# Patient Record
Sex: MALE | Race: WHITE | NOT HISPANIC OR LATINO | URBAN - METROPOLITAN AREA
[De-identification: names, ages, dates, MRNs, and addresses within clinical notes are randomized per-mention and may not be internally consistent; named-entity substitution may affect disease eponyms.]

---

## 2019-03-05 ENCOUNTER — HOSPITAL ENCOUNTER (OUTPATIENT)
Dept: OTHER | Facility: HOSPITAL | Age: 29
Discharge: HOME | End: 2019-03-05
Attending: ANESTHESIOLOGY | Admitting: ANESTHESIOLOGY

## 2019-03-05 LAB
ALBUMIN SERPL-MCNC: 4 G/DL (ref 3.5–4.8)
ALBUMIN/GLOB SERPL: 1.4 {RATIO} (ref 1–1.7)
ALP SERPL-CCNC: 43 IU/L (ref 32–91)
ALT SERPL-CCNC: 69 IU/L (ref 17–63)
ANION GAP SERPL CALC-SCNC: 15.5 MMOL/L (ref 10–20)
APTT BLD: 23.5 SEC (ref 24–31)
AST SERPL-CCNC: 27 IU/L (ref 15–41)
BASOPHILS # BLD AUTO: 0 10*3/UL (ref 0–0.2)
BASOPHILS NFR BLD AUTO: 0 % (ref 0–2)
BILIRUB SERPL-MCNC: 0.6 MG/DL (ref 0.3–1.2)
BILIRUB UR QL STRIP: NEGATIVE MG/DL
BUN SERPL-MCNC: 11 MG/DL (ref 8–20)
BUN/CREAT SERPL: 12.2 (ref 6.2–20.3)
CALCIUM SERPL-MCNC: 9 MG/DL (ref 8.9–10.3)
CASTS URNS QL MICRO: NORMAL /[LPF]
CHLORIDE SERPL-SCNC: 98 MMOL/L (ref 101–111)
COLOR UR: YELLOW
CONV BACTERIA IN URINE MICRO: NEGATIVE
CONV CLARITY OF URINE: CLEAR
CONV CO2: 27 MMOL/L (ref 22–32)
CONV HYALINE CASTS IN URINE MICRO: 0 /[LPF] (ref 0–5)
CONV PROTEIN IN URINE BY AUTOMATED TEST STRIP: NEGATIVE MG/DL
CONV SMALL ROUND CELLS: NORMAL /[HPF]
CONV SMEAR REVIEW: (no result)
CONV TOTAL PROTEIN: 6.9 G/DL (ref 6.1–7.9)
CONV UROBILINOGEN IN URINE BY AUTOMATED TEST STRIP: 0.2 MG/DL
CREAT UR-MCNC: 0.9 MG/DL (ref 0.7–1.2)
CULTURE INDICATED?: NORMAL
DIFFERENTIAL METHOD BLD: (no result)
EOSINOPHIL # BLD AUTO: 0 % (ref 0–3)
EOSINOPHIL # BLD AUTO: 0 10*3/UL (ref 0–0.3)
ERYTHROCYTE [DISTWIDTH] IN BLOOD BY AUTOMATED COUNT: 12.7 % (ref 11.5–14.5)
GLOBULIN UR ELPH-MCNC: 2.9 G/DL (ref 2.5–3.8)
GLUCOSE SERPL-MCNC: 142 MG/DL (ref 65–99)
GLUCOSE UR QL: NEGATIVE MG/DL
HCT VFR BLD AUTO: 53.1 % (ref 40–54)
HGB BLD-MCNC: 17.8 G/DL (ref 14–18)
HGB UR QL STRIP: NEGATIVE
INR PPP: 0.9
KETONES UR QL STRIP: NEGATIVE MG/DL
LEUKOCYTE ESTERASE UR QL STRIP: NEGATIVE
LYMPHOCYTES # BLD AUTO: 1.2 10*3/UL (ref 0.8–4.8)
LYMPHOCYTES NFR BLD AUTO: 8 % (ref 18–42)
MCH RBC QN AUTO: 30 PG (ref 26–32)
MCHC RBC AUTO-ENTMCNC: 33.6 G/DL (ref 32–36)
MCV RBC AUTO: 89.3 FL (ref 80–94)
MONOCYTES # BLD AUTO: 0.8 10*3/UL (ref 0.1–1.3)
MONOCYTES NFR BLD AUTO: 5 % (ref 2–11)
NEUTROPHILS # BLD AUTO: 13.5 10*3/UL (ref 2.3–8.6)
NEUTROPHILS NFR BLD AUTO: 87 % (ref 50–75)
NITRITE UR QL STRIP: NEGATIVE
NRBC BLD AUTO-RTO: 0 /100{WBCS}
PH UR STRIP.AUTO: 6.5 [PH] (ref 4.5–8)
PLATELET # BLD AUTO: 278 10*3/UL (ref 150–450)
PMV BLD AUTO: 9.1 FL (ref 7.4–10.4)
POTASSIUM SERPL-SCNC: 4.5 MMOL/L (ref 3.6–5.1)
PROTHROMBIN TIME: 9.7 SEC (ref 9.6–11.7)
RBC # BLD AUTO: 5.95 10*6/UL (ref 4.6–6)
RBC #/AREA URNS HPF: 2 /[HPF] (ref 0–3)
SODIUM SERPL-SCNC: 136 MMOL/L (ref 136–144)
SP GR UR: 1.02 (ref 1–1.03)
SPERM URNS QL MICRO: NORMAL /[HPF]
SQUAMOUS SPT QL MICRO: 0 /[HPF] (ref 0–5)
UNIDENT CRYS URNS QL MICRO: NORMAL /[HPF]
WBC # BLD AUTO: 15.5 10*3/UL (ref 4.5–11.5)
WBC #/AREA URNS HPF: 0 /[HPF] (ref 0–5)
YEAST SPEC QL WET PREP: NORMAL /[HPF]

## 2023-04-20 ENCOUNTER — APPOINTMENT (OUTPATIENT)
Dept: GENERAL RADIOLOGY | Facility: HOSPITAL | Age: 33
End: 2023-04-20
Payer: COMMERCIAL

## 2023-04-20 ENCOUNTER — HOSPITAL ENCOUNTER (EMERGENCY)
Facility: HOSPITAL | Age: 33
Discharge: HOME | End: 2023-04-20
Attending: EMERGENCY MEDICINE
Payer: COMMERCIAL

## 2023-04-20 ENCOUNTER — APPOINTMENT (OUTPATIENT)
Dept: CT IMAGING | Facility: HOSPITAL | Age: 33
End: 2023-04-20
Payer: COMMERCIAL

## 2023-04-20 VITALS
HEIGHT: 71 IN | OXYGEN SATURATION: 96 % | TEMPERATURE: 97.6 F | HEART RATE: 75 BPM | SYSTOLIC BLOOD PRESSURE: 112 MMHG | WEIGHT: 264.99 LBS | BODY MASS INDEX: 37.1 KG/M2 | RESPIRATION RATE: 17 BRPM | DIASTOLIC BLOOD PRESSURE: 81 MMHG

## 2023-04-20 DIAGNOSIS — S80.02XA CONTUSION OF LEFT KNEE, INITIAL ENCOUNTER: ICD-10-CM

## 2023-04-20 DIAGNOSIS — M54.2 NECK PAIN: ICD-10-CM
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
## 2023-04-20 DIAGNOSIS — S09.90XA INJURY OF HEAD, INITIAL ENCOUNTER: ICD-10-CM

## 2023-04-20 DIAGNOSIS — M25.562 ACUTE PAIN OF LEFT KNEE: ICD-10-CM

## 2023-04-20 DIAGNOSIS — M54.50 ACUTE LOW BACK PAIN WITHOUT SCIATICA, UNSPECIFIED BACK PAIN LATERALITY: ICD-10-CM

## 2023-04-20 PROCEDURE — 72110 X-RAY EXAM L-2 SPINE 4/>VWS: CPT

## 2023-04-20 PROCEDURE — 70450 CT HEAD/BRAIN W/O DYE: CPT

## 2023-04-20 PROCEDURE — 73562 X-RAY EXAM OF KNEE 3: CPT

## 2023-04-20 PROCEDURE — 99283 EMERGENCY DEPT VISIT LOW MDM: CPT

## 2023-04-20 PROCEDURE — 72050 X-RAY EXAM NECK SPINE 4/5VWS: CPT

## 2023-04-20 RX ORDER — METHOCARBAMOL 750 MG/1
750 TABLET, FILM COATED ORAL ONCE
Status: COMPLETED | OUTPATIENT
Start: 2023-04-20 | End: 2023-04-20

## 2023-04-20 RX ORDER — LIDOCAINE 50 MG/G
1 PATCH TOPICAL EVERY 24 HOURS
Qty: 30 EACH | Refills: 0 | Status: SHIPPED | OUTPATIENT
Start: 2023-04-20

## 2023-04-20 RX ORDER — LIDOCAINE 50 MG/G
1 PATCH TOPICAL
Status: DISCONTINUED | OUTPATIENT
Start: 2023-04-20 | End: 2023-04-21 | Stop reason: HOSPADM

## 2023-04-20 RX ORDER — IBUPROFEN 400 MG/1
800 TABLET ORAL ONCE
Status: COMPLETED | OUTPATIENT
Start: 2023-04-20 | End: 2023-04-20

## 2023-04-20 RX ORDER — METHOCARBAMOL 750 MG/1
750 TABLET, FILM COATED ORAL 3 TIMES DAILY PRN
Qty: 30 TABLET | Refills: 0 | Status: SHIPPED | OUTPATIENT
Start: 2023-04-20

## 2023-04-20 RX ADMIN — IBUPROFEN 800 MG: 400 TABLET ORAL at 22:11

## 2023-04-20 RX ADMIN — LIDOCAINE 1 PATCH: 700 PATCH TOPICAL at 22:11

## 2023-04-20 RX ADMIN — METHOCARBAMOL 750 MG: 750 TABLET ORAL at 22:11

## 2023-04-20 NOTE — Clinical Note
[facility] EMERGENCY DEPARTMENT  [address] [place] [address]  Phone: [phone]    Twan Santos was seen and treated in our emergency department on 4/20/2023.  He may return to work on 04/21/2023.         Thank you for choosing [facility].    Esau Pennington PA
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
## 2023-04-21 NOTE — DISCHARGE INSTRUCTIONS
Take Tylenol and ibuprofen for pain control as needed.  Rest and elevate extremity of pain. Recommend ice to injury 20 minutes on, 20 minutes off for a total of an hour at a time daily.  Follow up with orthopedic surgery or primary care provider in 10 days if pain/swelling persists.  Please use lidocaine patches as needed for local relief, if these are too expensive via prescription can use over-the-counter formulations instead.  Please use Robaxin as needed for muscle relaxer, do not operate heavy machinery or drive while taking this medication as this will make you sleepy.

## 2025-06-03 ENCOUNTER — APPOINTMENT (OUTPATIENT)
Dept: CARDIOLOGY | Facility: HOSPITAL | Age: 35
DRG: 287 | End: 2025-06-03

## 2025-06-03 ENCOUNTER — APPOINTMENT (OUTPATIENT)
Dept: ULTRASOUND IMAGING | Facility: HOSPITAL | Age: 35
DRG: 287 | End: 2025-06-03

## 2025-06-03 ENCOUNTER — HOSPITAL ENCOUNTER (INPATIENT)
Facility: HOSPITAL | Age: 35
LOS: 2 days | Discharge: HOME | DRG: 287 | End: 2025-06-06
Attending: INTERNAL MEDICINE | Admitting: INTERNAL MEDICINE

## 2025-06-03 ENCOUNTER — APPOINTMENT (OUTPATIENT)
Dept: CT IMAGING | Facility: HOSPITAL | Age: 35
DRG: 287 | End: 2025-06-03

## 2025-06-03 DIAGNOSIS — I42.9 CARDIOMYOPATHY, UNSPECIFIED TYPE: ICD-10-CM

## 2025-06-03 DIAGNOSIS — R06.00 DYSPNEA, UNSPECIFIED TYPE: ICD-10-CM

## 2025-06-03 DIAGNOSIS — N17.9 ACUTE KIDNEY INJURY: ICD-10-CM

## 2025-06-03 DIAGNOSIS — J90 PLEURAL EFFUSION, BILATERAL: ICD-10-CM

## 2025-06-03 DIAGNOSIS — R91.8 GROUND GLASS OPACITY PRESENT ON IMAGING OF LUNG: ICD-10-CM

## 2025-06-03 DIAGNOSIS — I50.21 ACUTE HFREF (HEART FAILURE WITH REDUCED EJECTION FRACTION): Primary | ICD-10-CM

## 2025-06-03 PROBLEM — E66.9 OBESITY (BMI 30-39.9): Status: ACTIVE | Noted: 2025-06-03

## 2025-06-03 PROBLEM — R03.0 ELEVATED BLOOD PRESSURE READING: Status: ACTIVE | Noted: 2025-06-03

## 2025-06-03 PROBLEM — R00.0 SINUS TACHYCARDIA: Status: ACTIVE | Noted: 2025-06-03

## 2025-06-03 LAB
ALBUMIN SERPL-MCNC: 4.5 G/DL (ref 3.5–5.2)
ALBUMIN/GLOB SERPL: 1.7 G/DL
ALP SERPL-CCNC: 48 U/L (ref 39–117)
ALT SERPL W P-5'-P-CCNC: 64 U/L (ref 1–41)
ANION GAP SERPL CALCULATED.3IONS-SCNC: 13.9 MMOL/L (ref 5–15)
AORTIC DIMENSIONLESS INDEX: 0.87 (DI)
APTT PPP: 23.1 SECONDS (ref 22.7–35.4)
AST SERPL-CCNC: 41 U/L (ref 1–40)
AV MEAN PRESS GRAD SYS DOP V1V2: 2 MMHG
AV VMAX SYS DOP: 81.4 CM/SEC
B PARAPERT DNA SPEC QL NAA+PROBE: NOT DETECTED
B PERT DNA SPEC QL NAA+PROBE: NOT DETECTED
BASOPHILS # BLD AUTO: 0.03 10*3/MM3 (ref 0–0.2)
BASOPHILS NFR BLD AUTO: 0.3 % (ref 0–1.5)
BH CV ECHO LEFT VENTRICLE GLOBAL LONGITUDINAL STRAIN: -2.8 %
BH CV ECHO MEAS - ACS: 2.2 CM
BH CV ECHO MEAS - AI P1/2T: 441.4 MSEC
BH CV ECHO MEAS - AO MAX PG: 2.7 MMHG
BH CV ECHO MEAS - AO V2 VTI: 12.3 CM
BH CV ECHO MEAS - AVA(I,D): 3.3 CM2
BH CV ECHO MEAS - EDV(CUBED): 216 ML
BH CV ECHO MEAS - EDV(MOD-SP2): 227 ML
BH CV ECHO MEAS - EDV(MOD-SP4): 231 ML
BH CV ECHO MEAS - EF(MOD-SP2): 19.8 %
BH CV ECHO MEAS - EF(MOD-SP4): 18.6 %
BH CV ECHO MEAS - ESV(CUBED): 125 ML
BH CV ECHO MEAS - ESV(MOD-SP2): 182 ML
BH CV ECHO MEAS - ESV(MOD-SP4): 188 ML
BH CV ECHO MEAS - FS: 16.7 %
BH CV ECHO MEAS - IVS/LVPW: 1 CM
BH CV ECHO MEAS - IVSD: 1 CM
BH CV ECHO MEAS - LA DIMENSION: 5 CM
BH CV ECHO MEAS - LAT PEAK E' VEL: 7.4 CM/SEC
BH CV ECHO MEAS - LV DIASTOLIC VOL/BSA (35-75): 100 CM2
BH CV ECHO MEAS - LV MASS(C)D: 246.9 GRAMS
BH CV ECHO MEAS - LV MAX PG: 2.6 MMHG
BH CV ECHO MEAS - LV MEAN PG: 1 MMHG
BH CV ECHO MEAS - LV SYSTOLIC VOL/BSA (12-30): 81.4 CM2
BH CV ECHO MEAS - LV V1 MAX: 80.2 CM/SEC
BH CV ECHO MEAS - LV V1 VTI: 10.7 CM
BH CV ECHO MEAS - LVIDD: 6 CM
BH CV ECHO MEAS - LVIDS: 5 CM
BH CV ECHO MEAS - LVOT AREA: 3.8 CM2
BH CV ECHO MEAS - LVOT DIAM: 2.2 CM
BH CV ECHO MEAS - LVPWD: 1 CM
BH CV ECHO MEAS - MED PEAK E' VEL: 6.7 CM/SEC
BH CV ECHO MEAS - MR MAX PG: 89.9 MMHG
BH CV ECHO MEAS - MR MAX VEL: 474 CM/SEC
BH CV ECHO MEAS - MR MEAN PG: 63 MMHG
BH CV ECHO MEAS - MR MEAN VEL: 378 CM/SEC
BH CV ECHO MEAS - MR VTI: 121 CM
BH CV ECHO MEAS - MV A MAX VEL: 37.7 CM/SEC
BH CV ECHO MEAS - MV DEC SLOPE: 771 CM/SEC2
BH CV ECHO MEAS - MV DEC TIME: 0.09 SEC
BH CV ECHO MEAS - MV E MAX VEL: 130 CM/SEC
BH CV ECHO MEAS - MV E/A: 3.4
BH CV ECHO MEAS - MV MAX PG: 6.2 MMHG
BH CV ECHO MEAS - MV MEAN PG: 3 MMHG
BH CV ECHO MEAS - MV P1/2T: 49.8 MSEC
BH CV ECHO MEAS - MV V2 VTI: 21.7 CM
BH CV ECHO MEAS - MVA(P1/2T): 4.4 CM2
BH CV ECHO MEAS - MVA(VTI): 1.87 CM2
BH CV ECHO MEAS - PA ACC TIME: 0.1 SEC
BH CV ECHO MEAS - PA V2 MAX: 52.1 CM/SEC
BH CV ECHO MEAS - PI END-D VEL: 166 CM/SEC
BH CV ECHO MEAS - RAP SYSTOLE: 15 MMHG
BH CV ECHO MEAS - RV MAX PG: 0.52 MMHG
BH CV ECHO MEAS - RV V1 MAX: 35.9 CM/SEC
BH CV ECHO MEAS - RV V1 VTI: 6.3 CM
BH CV ECHO MEAS - RVSP: 36.3 MMHG
BH CV ECHO MEAS - SV(LVOT): 40.7 ML
BH CV ECHO MEAS - SV(MOD-SP2): 45 ML
BH CV ECHO MEAS - SV(MOD-SP4): 43 ML
BH CV ECHO MEAS - SVI(LVOT): 17.6 ML/M2
BH CV ECHO MEAS - SVI(MOD-SP2): 19.5 ML/M2
BH CV ECHO MEAS - SVI(MOD-SP4): 18.6 ML/M2
BH CV ECHO MEAS - TAPSE (>1.6): 1.12 CM
BH CV ECHO MEAS - TR MAX PG: 21.3 MMHG
BH CV ECHO MEAS - TR MAX VEL: 231 CM/SEC
BH CV ECHO MEASUREMENTS AVERAGE E/E' RATIO: 18.44
BH CV XLRA - RV BASE: 5.9 CM
BH CV XLRA - RV LENGTH: 10.2 CM
BH CV XLRA - RV MID: 4.6 CM
BH CV XLRA - TDI S': 7.5 CM/SEC
BILIRUB SERPL-MCNC: 0.8 MG/DL (ref 0–1.2)
BUN SERPL-MCNC: 9.1 MG/DL (ref 6–20)
BUN/CREAT SERPL: 7.1 (ref 7–25)
C PNEUM DNA NPH QL NAA+NON-PROBE: NOT DETECTED
CALCIUM SPEC-SCNC: 9.2 MG/DL (ref 8.6–10.5)
CHLORIDE SERPL-SCNC: 100 MMOL/L (ref 98–107)
CO2 SERPL-SCNC: 21.1 MMOL/L (ref 22–29)
CREAT SERPL-MCNC: 1.28 MG/DL (ref 0.76–1.27)
DEPRECATED RDW RBC AUTO: 39.7 FL (ref 37–54)
EGFRCR SERPLBLD CKD-EPI 2021: 74.4 ML/MIN/1.73
EOSINOPHIL # BLD AUTO: 0.07 10*3/MM3 (ref 0–0.4)
EOSINOPHIL NFR BLD AUTO: 0.8 % (ref 0.3–6.2)
ERYTHROCYTE [DISTWIDTH] IN BLOOD BY AUTOMATED COUNT: 12.7 % (ref 12.3–15.4)
FLUAV SUBTYP SPEC NAA+PROBE: NOT DETECTED
FLUBV RNA ISLT QL NAA+PROBE: NOT DETECTED
GEN 5 1HR TROPONIN T REFLEX: 32 NG/L
GLOBULIN UR ELPH-MCNC: 2.6 GM/DL
GLUCOSE SERPL-MCNC: 199 MG/DL (ref 65–99)
HADV DNA SPEC NAA+PROBE: NOT DETECTED
HCOV 229E RNA SPEC QL NAA+PROBE: NOT DETECTED
HCOV HKU1 RNA SPEC QL NAA+PROBE: NOT DETECTED
HCOV NL63 RNA SPEC QL NAA+PROBE: NOT DETECTED
HCOV OC43 RNA SPEC QL NAA+PROBE: NOT DETECTED
HCT VFR BLD AUTO: 49.8 % (ref 37.5–51)
HGB BLD-MCNC: 16.8 G/DL (ref 13–17.7)
HMPV RNA NPH QL NAA+NON-PROBE: NOT DETECTED
HOLD SPECIMEN: NORMAL
HPIV1 RNA ISLT QL NAA+PROBE: NOT DETECTED
HPIV2 RNA SPEC QL NAA+PROBE: NOT DETECTED
HPIV3 RNA NPH QL NAA+PROBE: NOT DETECTED
HPIV4 P GENE NPH QL NAA+PROBE: NOT DETECTED
IMM GRANULOCYTES # BLD AUTO: 0.03 10*3/MM3 (ref 0–0.05)
IMM GRANULOCYTES NFR BLD AUTO: 0.3 % (ref 0–0.5)
INR PPP: 1.07 (ref 0.9–1.1)
LEFT ATRIUM VOLUME INDEX: 33.5 ML/M2
LV EF BIPLANE MOD: 19.1 %
LYMPHOCYTES # BLD AUTO: 1.86 10*3/MM3 (ref 0.7–3.1)
LYMPHOCYTES NFR BLD AUTO: 20.1 % (ref 19.6–45.3)
M PNEUMO IGG SER IA-ACNC: NOT DETECTED
MAGNESIUM SERPL-MCNC: 2 MG/DL (ref 1.6–2.6)
MCH RBC QN AUTO: 29.1 PG (ref 26.6–33)
MCHC RBC AUTO-ENTMCNC: 33.7 G/DL (ref 31.5–35.7)
MCV RBC AUTO: 86.2 FL (ref 79–97)
MONOCYTES # BLD AUTO: 0.62 10*3/MM3 (ref 0.1–0.9)
MONOCYTES NFR BLD AUTO: 6.7 % (ref 5–12)
NEUTROPHILS NFR BLD AUTO: 6.63 10*3/MM3 (ref 1.7–7)
NEUTROPHILS NFR BLD AUTO: 71.8 % (ref 42.7–76)
NRBC BLD AUTO-RTO: 0 /100 WBC (ref 0–0.2)
NT-PROBNP SERPL-MCNC: 807 PG/ML (ref 0–450)
PLATELET # BLD AUTO: 275 10*3/MM3 (ref 140–450)
PMV BLD AUTO: 12.1 FL (ref 6–12)
POTASSIUM SERPL-SCNC: 4.6 MMOL/L (ref 3.5–5.2)
PROCALCITONIN SERPL-MCNC: 0.05 NG/ML (ref 0–0.25)
PROT SERPL-MCNC: 7.1 G/DL (ref 6–8.5)
PROTHROMBIN TIME: 13.9 SECONDS (ref 11.7–14.2)
RBC # BLD AUTO: 5.78 10*6/MM3 (ref 4.14–5.8)
RHINOVIRUS RNA SPEC NAA+PROBE: NOT DETECTED
RSV RNA NPH QL NAA+NON-PROBE: NOT DETECTED
SARS-COV-2 RNA RESP QL NAA+PROBE: NOT DETECTED
SINUS: 3.3 CM
SODIUM SERPL-SCNC: 135 MMOL/L (ref 136–145)
STJ: 2.5 CM
T4 FREE SERPL-MCNC: 1.49 NG/DL (ref 0.92–1.68)
TROPONIN T % DELTA: 39
TROPONIN T NUMERIC DELTA: 9 NG/L
TROPONIN T SERPL HS-MCNC: 23 NG/L
TSH SERPL DL<=0.05 MIU/L-ACNC: 1.86 UIU/ML (ref 0.27–4.2)
TSH SERPL DL<=0.05 MIU/L-ACNC: 1.99 UIU/ML (ref 0.27–4.2)
WBC NRBC COR # BLD AUTO: 9.24 10*3/MM3 (ref 3.4–10.8)

## 2025-06-03 PROCEDURE — 84145 PROCALCITONIN (PCT): CPT | Performed by: NURSE PRACTITIONER

## 2025-06-03 PROCEDURE — G0378 HOSPITAL OBSERVATION PER HR: HCPCS

## 2025-06-03 PROCEDURE — 0202U NFCT DS 22 TRGT SARS-COV-2: CPT | Performed by: NURSE PRACTITIONER

## 2025-06-03 PROCEDURE — 84443 ASSAY THYROID STIM HORMONE: CPT | Performed by: NURSE PRACTITIONER

## 2025-06-03 PROCEDURE — 93356 MYOCRD STRAIN IMG SPCKL TRCK: CPT

## 2025-06-03 PROCEDURE — 93306 TTE W/DOPPLER COMPLETE: CPT | Performed by: INTERNAL MEDICINE

## 2025-06-03 PROCEDURE — 84484 ASSAY OF TROPONIN QUANT: CPT | Performed by: NURSE PRACTITIONER

## 2025-06-03 PROCEDURE — 85610 PROTHROMBIN TIME: CPT | Performed by: NURSE PRACTITIONER

## 2025-06-03 PROCEDURE — 25010000002 SULFUR HEXAFLUORIDE MICROSPH 60.7-25 MG RECONSTITUTED SUSPENSION: Performed by: INTERNAL MEDICINE

## 2025-06-03 PROCEDURE — 85025 COMPLETE CBC W/AUTO DIFF WBC: CPT | Performed by: NURSE PRACTITIONER

## 2025-06-03 PROCEDURE — 84439 ASSAY OF FREE THYROXINE: CPT | Performed by: INTERNAL MEDICINE

## 2025-06-03 PROCEDURE — 99254 IP/OBS CNSLTJ NEW/EST MOD 60: CPT | Performed by: INTERNAL MEDICINE

## 2025-06-03 PROCEDURE — 99285 EMERGENCY DEPT VISIT HI MDM: CPT

## 2025-06-03 PROCEDURE — 93356 MYOCRD STRAIN IMG SPCKL TRCK: CPT | Performed by: INTERNAL MEDICINE

## 2025-06-03 PROCEDURE — 80053 COMPREHEN METABOLIC PANEL: CPT | Performed by: NURSE PRACTITIONER

## 2025-06-03 PROCEDURE — 84443 ASSAY THYROID STIM HORMONE: CPT | Performed by: INTERNAL MEDICINE

## 2025-06-03 PROCEDURE — 25010000002 LABETALOL 5 MG/ML SOLUTION: Performed by: INTERNAL MEDICINE

## 2025-06-03 PROCEDURE — 85730 THROMBOPLASTIN TIME PARTIAL: CPT | Performed by: NURSE PRACTITIONER

## 2025-06-03 PROCEDURE — 93306 TTE W/DOPPLER COMPLETE: CPT

## 2025-06-03 PROCEDURE — 83735 ASSAY OF MAGNESIUM: CPT | Performed by: NURSE PRACTITIONER

## 2025-06-03 PROCEDURE — 93005 ELECTROCARDIOGRAM TRACING: CPT

## 2025-06-03 PROCEDURE — 25010000002 FUROSEMIDE PER 20 MG: Performed by: INTERNAL MEDICINE

## 2025-06-03 PROCEDURE — 71275 CT ANGIOGRAPHY CHEST: CPT

## 2025-06-03 PROCEDURE — 36415 COLL VENOUS BLD VENIPUNCTURE: CPT

## 2025-06-03 PROCEDURE — 93005 ELECTROCARDIOGRAM TRACING: CPT | Performed by: INTERNAL MEDICINE

## 2025-06-03 PROCEDURE — 76775 US EXAM ABDO BACK WALL LIM: CPT

## 2025-06-03 PROCEDURE — 25510000001 IOPAMIDOL PER 1 ML: Performed by: NURSE PRACTITIONER

## 2025-06-03 PROCEDURE — 83880 ASSAY OF NATRIURETIC PEPTIDE: CPT | Performed by: NURSE PRACTITIONER

## 2025-06-03 PROCEDURE — 25010000002 FUROSEMIDE PER 20 MG: Performed by: NURSE PRACTITIONER

## 2025-06-03 RX ORDER — SODIUM CHLORIDE 0.9 % (FLUSH) 0.9 %
10 SYRINGE (ML) INJECTION AS NEEDED
Status: DISCONTINUED | OUTPATIENT
Start: 2025-06-03 | End: 2025-06-06 | Stop reason: HOSPADM

## 2025-06-03 RX ORDER — SODIUM CHLORIDE 9 MG/ML
40 INJECTION, SOLUTION INTRAVENOUS AS NEEDED
Status: DISCONTINUED | OUTPATIENT
Start: 2025-06-03 | End: 2025-06-06 | Stop reason: HOSPADM

## 2025-06-03 RX ORDER — NITROGLYCERIN 0.4 MG/1
0.4 TABLET SUBLINGUAL
Status: DISCONTINUED | OUTPATIENT
Start: 2025-06-03 | End: 2025-06-06 | Stop reason: HOSPADM

## 2025-06-03 RX ORDER — IOPAMIDOL 755 MG/ML
100 INJECTION, SOLUTION INTRAVASCULAR
Status: COMPLETED | OUTPATIENT
Start: 2025-06-03 | End: 2025-06-03

## 2025-06-03 RX ORDER — ALBUTEROL SULFATE 90 UG/1
1 INHALANT RESPIRATORY (INHALATION)
COMMUNITY
End: 2025-06-11

## 2025-06-03 RX ORDER — LABETALOL HYDROCHLORIDE 5 MG/ML
10 INJECTION, SOLUTION INTRAVENOUS ONCE
Status: COMPLETED | OUTPATIENT
Start: 2025-06-03 | End: 2025-06-03

## 2025-06-03 RX ORDER — SODIUM CHLORIDE 0.9 % (FLUSH) 0.9 %
10 SYRINGE (ML) INJECTION EVERY 12 HOURS SCHEDULED
Status: DISCONTINUED | OUTPATIENT
Start: 2025-06-03 | End: 2025-06-06 | Stop reason: HOSPADM

## 2025-06-03 RX ORDER — GLIPIZIDE 5 MG/1
5 TABLET, FILM COATED, EXTENDED RELEASE ORAL DAILY
COMMUNITY
End: 2025-06-06 | Stop reason: HOSPADM

## 2025-06-03 RX ORDER — METOPROLOL SUCCINATE 25 MG/1
12.5 TABLET, EXTENDED RELEASE ORAL EVERY 12 HOURS SCHEDULED
Status: DISCONTINUED | OUTPATIENT
Start: 2025-06-03 | End: 2025-06-05

## 2025-06-03 RX ORDER — AMOXICILLIN 250 MG
2 CAPSULE ORAL 2 TIMES DAILY PRN
Status: DISCONTINUED | OUTPATIENT
Start: 2025-06-03 | End: 2025-06-06 | Stop reason: HOSPADM

## 2025-06-03 RX ORDER — FUROSEMIDE 10 MG/ML
80 INJECTION INTRAMUSCULAR; INTRAVENOUS ONCE
Status: COMPLETED | OUTPATIENT
Start: 2025-06-03 | End: 2025-06-03

## 2025-06-03 RX ORDER — ACETAMINOPHEN 650 MG/1
650 SUPPOSITORY RECTAL EVERY 4 HOURS PRN
Status: DISCONTINUED | OUTPATIENT
Start: 2025-06-03 | End: 2025-06-06 | Stop reason: HOSPADM

## 2025-06-03 RX ORDER — DEXTROMETHORPHAN POLISTIREX 30 MG/5ML
60 SUSPENSION ORAL 2 TIMES DAILY PRN
COMMUNITY
End: 2025-06-06 | Stop reason: HOSPADM

## 2025-06-03 RX ORDER — ONDANSETRON 4 MG/1
4 TABLET, ORALLY DISINTEGRATING ORAL EVERY 6 HOURS PRN
Status: DISCONTINUED | OUTPATIENT
Start: 2025-06-03 | End: 2025-06-06 | Stop reason: HOSPADM

## 2025-06-03 RX ORDER — ONDANSETRON 2 MG/ML
4 INJECTION INTRAMUSCULAR; INTRAVENOUS EVERY 6 HOURS PRN
Status: DISCONTINUED | OUTPATIENT
Start: 2025-06-03 | End: 2025-06-06 | Stop reason: HOSPADM

## 2025-06-03 RX ORDER — POLYETHYLENE GLYCOL 3350 17 G/17G
17 POWDER, FOR SOLUTION ORAL DAILY PRN
Status: DISCONTINUED | OUTPATIENT
Start: 2025-06-03 | End: 2025-06-06 | Stop reason: HOSPADM

## 2025-06-03 RX ORDER — OXYCODONE HYDROCHLORIDE 10 MG/1
5-10 TABLET ORAL 3 TIMES DAILY PRN
COMMUNITY
End: 2025-06-06 | Stop reason: HOSPADM

## 2025-06-03 RX ORDER — ACETAMINOPHEN 160 MG/5ML
650 SOLUTION ORAL EVERY 4 HOURS PRN
Status: DISCONTINUED | OUTPATIENT
Start: 2025-06-03 | End: 2025-06-06 | Stop reason: HOSPADM

## 2025-06-03 RX ORDER — ACETAMINOPHEN 325 MG/1
650 TABLET ORAL EVERY 4 HOURS PRN
Status: DISCONTINUED | OUTPATIENT
Start: 2025-06-03 | End: 2025-06-06 | Stop reason: HOSPADM

## 2025-06-03 RX ORDER — FUROSEMIDE 10 MG/ML
40 INJECTION INTRAMUSCULAR; INTRAVENOUS EVERY 12 HOURS
Status: DISCONTINUED | OUTPATIENT
Start: 2025-06-03 | End: 2025-06-05

## 2025-06-03 RX ORDER — ASPIRIN 81 MG/1
324 TABLET, CHEWABLE ORAL ONCE
Status: CANCELLED | OUTPATIENT
Start: 2025-06-03 | End: 2025-06-03

## 2025-06-03 RX ORDER — ASPIRIN 81 MG/1
81 TABLET ORAL DAILY
Status: CANCELLED | OUTPATIENT
Start: 2025-06-04

## 2025-06-03 RX ORDER — TESTOSTERONE CYPIONATE 200 MG/ML
1.5 VIAL (ML) INTRAMUSCULAR
COMMUNITY

## 2025-06-03 RX ORDER — BISACODYL 5 MG/1
5 TABLET, DELAYED RELEASE ORAL DAILY PRN
Status: DISCONTINUED | OUTPATIENT
Start: 2025-06-03 | End: 2025-06-06 | Stop reason: HOSPADM

## 2025-06-03 RX ORDER — BISACODYL 10 MG
10 SUPPOSITORY, RECTAL RECTAL DAILY PRN
Status: DISCONTINUED | OUTPATIENT
Start: 2025-06-03 | End: 2025-06-06 | Stop reason: HOSPADM

## 2025-06-03 RX ADMIN — LABETALOL HYDROCHLORIDE 10 MG: 5 INJECTION, SOLUTION INTRAVENOUS at 13:37

## 2025-06-03 RX ADMIN — FUROSEMIDE 80 MG: 10 INJECTION, SOLUTION INTRAMUSCULAR; INTRAVENOUS at 08:34

## 2025-06-03 RX ADMIN — FUROSEMIDE 40 MG: 10 INJECTION, SOLUTION INTRAMUSCULAR; INTRAVENOUS at 20:41

## 2025-06-03 RX ADMIN — Medication 10 ML: at 20:42

## 2025-06-03 RX ADMIN — METOPROLOL SUCCINATE 12.5 MG: 25 TABLET, EXTENDED RELEASE ORAL at 20:42

## 2025-06-03 RX ADMIN — Medication 10 ML: at 13:38

## 2025-06-03 RX ADMIN — SULFUR HEXAFLUORIDE 2 ML: KIT at 16:22

## 2025-06-03 RX ADMIN — IOPAMIDOL 100 ML: 755 INJECTION, SOLUTION INTRAVENOUS at 07:49

## 2025-06-03 NOTE — H&P
Patient Care Team:  Logan Del Rio MD as PCP - General    Chief complaint shortness of breath    Subjective     Patient is a 35 y.o. male with no significant past medical history who presents with progressive shortness of breath x 1 month.  He has a dry cough.  He was treated with Zithromax about a month ago when symptoms first started.  His shortness of breath got progressively worse.  He now has orthopnea and has not slept the last 2 nights because of his difficulty breathing.  He had a chest x-ray done last Friday at his PCP office that was read as normal.  Has an upcoming appointment with respiratory therapy.  He does not smoke vape or use any other substances.  He has no history of asthma.  There has been no leg edema.  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    Review of Systems   Constitutional:  Positive for activity change and fatigue. Negative for appetite change, chills, diaphoresis, fever and unexpected weight change.   HENT:  Negative for congestion, facial swelling, hearing loss and postnasal drip.    Eyes:  Negative for visual disturbance.   Respiratory:  Positive for cough and shortness of breath. Negative for wheezing and stridor.    Gastrointestinal:  Negative for abdominal pain, constipation, diarrhea, nausea and vomiting.   Genitourinary:  Negative for dysuria.   Musculoskeletal:  Negative for arthralgias, back pain and gait problem.   Skin:  Negative for rash.   Neurological:  Negative for tremors, syncope and weakness.   Psychiatric/Behavioral:  Negative for confusion.           History  No past medical history on file.  No past surgical history on file.  No family history on file.     (Not in a hospital admission)    Allergies:  Patient has no known allergies.    Objective     Vital Signs  Temp:  [97.3 °F (36.3 °C)] 97.3 °F (36.3 °C)  Heart Rate:  [103-133] 123  Resp:  [20] 20  BP: (127-155)/() 144/112     Physical  Exam:      General Appearance:    Alert, cooperative, in no acute distress, pleasant   Head:    Normocephalic, without obvious abnormality, atraumatic   Eyes:            Lids and lashes normal, conjunctivae and sclerae normal, no   icterus, no pallor, corneas clear, PERRLA   Ears:    Ears appear intact with no abnormalities noted   Throat:   No oral lesions, no thrush, oral mucosa moist   Neck:   No adenopathy, supple, trachea midline, no thyromegaly, no   carotid bruit, no JVD   Lungs:   Bibasilar crackles    Heart:    Regular rhythm and normal rate, normal S1 and S2, no            murmur, no gallop, no rub, no click   Chest Wall:    No abnormalities observed   Abdomen:     Normal bowel sounds, no masses, no organomegaly, soft        non-tender, non-distended, no guarding, no rebound                tenderness   Extremities:   Moves all extremities well, no edema, no cyanosis, no             redness   Pulses:   Pulses palpable and equal bilaterally   Skin:   No bleeding, bruising or rash   Lymph nodes:   No palpable adenopathy   Neurologic:   Cranial nerves 2 - 12 grossly intact, sensation intact, DTR       present and equal bilaterally       Results Review:     Imaging Results (Last 24 Hours)       Procedure Component Value Units Date/Time    CT Angiogram Chest Pulmonary Embolism [[id number]] Collected: 06/03/25 0751     Updated: 06/03/25 0757    Narrative:      CT ANGIOGRAM CHEST PULMONARY EMBOLISM    Date of Exam: 6/3/2025 7:25 AM EDT    Indication: dyspnea/tachycardia/hypoxia.    Comparison: None available.    Technique: Axial CT images were obtained of the chest after the uneventful intravenous administration of iodinated contrast utilizing pulmonary embolism protocol.  In addition, a 3-D volume rendered image was created for interpretation.  Sagittal and   coronal reconstructions were performed.  Automated exposure control and iterative reconstruction methods were used.      Findings:  PULMONARY VASCULATURE:  Pulmonary arteries are widely patent without evidence of embolus.  Main pulmonary artery is normal in size. No evidence of right heart strain.    MEDIASTINUM:Unremarkable. Aortic and heart size are normal. No aortic dissection identified. No mass nor pericardial effusion.  CORONARY ARTERIES: No calcified atherosclerotic disease.  LUNGS: Widespread groundglass attenuation of the lungs without lobar consolidation. Few scattered areas of more dense airspace disease most pronounced in the lingula. Findings are most suggestive of infectious process in particular viral or atypical   pathogen. Correlate with history and symptoms. Noninfectious pulmonary edema is a consideration as well. Correlate with symptoms.   PLEURAL SPACE: There are small bilateral pleural effusions.  LYMPH NODES: There are no pathologically enlarged lymph nodes.    UPPER ABDOMEN: There is a benign 2.4 cm left adrenal adenoma.    OSSEOUS STRUCTURES: Appropriate for age with no acute process identified.      Impression:      Impression:  1.No evidence of pulmonary embolus.  2.Widespread groundglass attenuation of the lungs without lobar consolidation. Few scattered areas of more dense airspace disease most pronounced in the lingula. Findings are most suggestive of infectious process in particular viral or atypical pathogen.   Noninfectious pulmonary edema is a consideration as well. Correlate with symptoms.  3.Small bilateral pleural effusions.  4.Benign 2.4 cm left adrenal adenoma.        Electronically Signed: Kyle Gongora MD    6/3/2025 7:55 AM EDT    Workstation ID: [id number]             Lab Results (last 24 hours)       Procedure Component Value Units Date/Time    High Sensitivity Troponin T 1Hr [[id number]]  (Abnormal) Collected: 06/03/25 0832    Specimen: Blood from Arm, Left Updated: 06/03/25 0900     HS Troponin T 32 ng/L      Troponin T Numeric Delta 9 ng/L      Troponin T % Delta 39    Narrative:      High Sensitive Troponin T Reference  Range:  <14.0 ng/L- Negative Female for AMI  <22.0 ng/L- Negative Male for AMI  >=14 - Abnormal Female indicating possible myocardial injury.  >=22 - Abnormal Male indicating possible myocardial injury.   Clinicians would have to utilize clinical acumen, EKG, Troponin, and serial changes to determine if it is an Acute Myocardial Infarction or myocardial injury due to an underlying chronic condition.         Respiratory Panel PCR w/COVID-19(SARS-CoV-2) [facility] In-House, NP Swab in UTM/VTM, 2 HR TAT - Swab, Nasopharynx [[id number]]  (Normal) Collected: 06/03/25 0721    Specimen: Swab from Nasopharynx Updated: 06/03/25 0815     ADENOVIRUS, PCR Not Detected     Coronavirus 229E Not Detected     Coronavirus HKU1 Not Detected     Coronavirus NL63 Not Detected     Coronavirus OC43 Not Detected     COVID19 Not Detected     Human Metapneumovirus Not Detected     Human Rhinovirus/Enterovirus Not Detected     Influenza A PCR Not Detected     Influenza B PCR Not Detected     Parainfluenza Virus 1 Not Detected     Parainfluenza Virus 2 Not Detected     Parainfluenza Virus 3 Not Detected     Parainfluenza Virus 4 Not Detected     RSV, PCR Not Detected     Bordetella pertussis pcr Not Detected     Bordetella parapertussis PCR Not Detected     Chlamydophila pneumoniae PCR Not Detected     Mycoplasma pneumo by PCR Not Detected    Narrative:      In the setting of a positive respiratory panel with a viral infection PLUS a negative procalcitonin without other underlying concern for bacterial infection, consider observing off antibiotics or discontinuation of antibiotics and continue supportive care. If the respiratory panel is positive for atypical bacterial infection (Bordetella pertussis, Chlamydophila pneumoniae, or Mycoplasma pneumoniae), consider antibiotic de-escalation to target atypical bacterial infection.    Comprehensive Metabolic Panel [[id number]]  (Abnormal) Collected: 06/03/25 0721    Specimen: Blood  Updated: 06/03/25 0801     Glucose 199 mg/dL      BUN 9.1 mg/dL      Creatinine 1.28 mg/dL      Sodium 135 mmol/L      Potassium 4.6 mmol/L      Comment: Specimen hemolyzed.  Result may be falsely elevated.        Chloride 100 mmol/L      CO2 21.1 mmol/L      Calcium 9.2 mg/dL      Total Protein 7.1 g/dL      Albumin 4.5 g/dL      ALT (SGPT) 64 U/L      AST (SGOT) 41 U/L      Alkaline Phosphatase 48 U/L      Total Bilirubin 0.8 mg/dL      Globulin 2.6 gm/dL      A/G Ratio 1.7 g/dL      BUN/Creatinine Ratio 7.1     Anion Gap 13.9 mmol/L      eGFR 74.4 mL/min/1.73     Narrative:      GFR Categories in Chronic Kidney Disease (CKD)              GFR Category          GFR (mL/min/1.73)    Interpretation  G1                    90 or greater        Normal or high (1)  G2                    60-89                Mild decrease (1)  G3a                   45-59                Mild to moderate decrease  G3b                   30-44                Moderate to severe decrease  G4                    15-29                Severe decrease  G5                    14 or less           Kidney failure    (1)In the absence of evidence of kidney disease, neither GFR category G1 or G2 fulfill the criteria for CKD.    eGFR calculation 2021 CKD-EPI creatinine equation, which does not include race as a factor    Magnesium [[id number]]  (Normal) Collected: 06/03/25 0721    Specimen: Blood Updated: 06/03/25 0801     Magnesium 2.0 mg/dL     High Sensitivity Troponin T [[id number]]  (Abnormal) Collected: 06/03/25 0721    Specimen: Blood Updated: 06/03/25 0757     HS Troponin T 23 ng/L     Narrative:      High Sensitive Troponin T Reference Range:  <14.0 ng/L- Negative Female for AMI  <22.0 ng/L- Negative Male for AMI  >=14 - Abnormal Female indicating possible myocardial injury.  >=22 - Abnormal Male indicating possible myocardial injury.   Clinicians would have to utilize clinical acumen, EKG, Troponin, and serial changes to determine if it is an Acute  "Myocardial Infarction or myocardial injury due to an underlying chronic condition.         BNP [[id number]]  (Abnormal) Collected: 06/03/25 0721    Specimen: Blood Updated: 06/03/25 0757     proBNP 807.0 pg/mL     Narrative:      This assay is used as an aid in the diagnosis of individuals suspected of having heart failure. It can be used as an aid in the diagnosis of acute decompensated heart failure (ADHF) in patients presenting with signs and symptoms of ADHF to the emergency department (ED). In addition, NT-proBNP of <300 pg/mL indicates ADHF is not likely.    Age Range Result Interpretation  NT-proBNP Concentration (pg/mL:      <50             Positive            >450                   Gray                 300-450                    Negative             <300    50-75           Positive            >900                  Gray                300-900                  Negative            <300      >75             Positive            >1800                  Gray                300-1800                  Negative            <300    Procalcitonin [[id number]]  (Normal) Collected: 06/03/25 0721    Specimen: Blood Updated: 06/03/25 0757     Procalcitonin 0.05 ng/mL     Narrative:      As a Marker for Sepsis (Non-Neonates):    1. <0.5 ng/mL represents a low risk of severe sepsis and/or septic shock.  2. >2 ng/mL represents a high risk of severe sepsis and/or septic shock.    As a Marker for Lower Respiratory Tract Infections that require antibiotic therapy:    PCT on Admission    Antibiotic Therapy       6-12 Hrs later    >0.5                Strongly Recommended  >0.25 - <0.5        Recommended   0.1 - 0.25          Discouraged              Remeasure/reassess PCT  <0.1                Strongly Discouraged     Remeasure/reassess PCT    As 28 day mortality risk marker: \"Change in Procalcitonin Result\" (>80% or <=80%) if Day 0 (or Day 1) and Day 4 values are available. Refer to http://www.[employer]-pct-calculator.com    Change in PCT " <=80%  A decrease of PCT levels below or equal to 80% defines a positive change in PCT test result representing a higher risk for 28-day all-cause mortality of patients diagnosed with severe sepsis for septic shock.    Change in PCT >80%  A decrease of PCT levels of more than 80% defines a negative change in PCT result representing a lower risk for 28-day all-cause mortality of patients diagnosed with severe sepsis or septic shock.       TSH Rfx On Abnormal To Free T4 [[id number]]  (Normal) Collected: 06/03/25 0721    Specimen: Blood Updated: 06/03/25 0757     TSH 1.990 uIU/mL     CBC & Differential [[id number]]  (Abnormal) Collected: 06/03/25 0721    Specimen: Blood Updated: 06/03/25 0754    Narrative:      The following orders were created for panel order CBC & Differential.  Procedure                               Abnormality         Status                     ---------                               -----------         ------                     CBC Auto Differential[[id number]]        Abnormal            Final result                 Please view results for these tests on the individual orders.    CBC Auto Differential [[id number]]  (Abnormal) Collected: 06/03/25 0721    Specimen: Blood Updated: 06/03/25 0754     WBC 9.24 10*3/mm3      RBC 5.78 10*6/mm3      Hemoglobin 16.8 g/dL      Hematocrit 49.8 %      MCV 86.2 fL      MCH 29.1 pg      MCHC 33.7 g/dL      RDW 12.7 %      RDW-SD 39.7 fl      MPV 12.1 fL      Platelets 275 10*3/mm3      Neutrophil % 71.8 %      Lymphocyte % 20.1 %      Monocyte % 6.7 %      Eosinophil % 0.8 %      Basophil % 0.3 %      Immature Grans % 0.3 %      Neutrophils, Absolute 6.63 10*3/mm3      Lymphocytes, Absolute 1.86 10*3/mm3      Monocytes, Absolute 0.62 10*3/mm3      Eosinophils, Absolute 0.07 10*3/mm3      Basophils, Absolute 0.03 10*3/mm3      Immature Grans, Absolute 0.03 10*3/mm3      nRBC 0.0 /100 WBC     Protime-INR [[id number]]  (Normal) Collected: 06/03/25 0721    Specimen:  Blood Updated: 06/03/25 0740     Protime 13.9 Seconds      INR 1.07    aPTT [[id number]]  (Normal) Collected: 06/03/25 0721    Specimen: Blood Updated: 06/03/25 0740     PTT 23.1 seconds     Extra Tubes [[id number]] Collected: 06/03/25 0721    Specimen: Blood, Venous Line Updated: 06/03/25 0730    Narrative:      The following orders were created for panel order Extra Tubes.  Procedure                               Abnormality         Status                     ---------                               -----------         ------                     Gold Top - SST[[id number]]                                   Final result                 Please view results for these tests on the individual orders.    Gold Top - SST [[id number]] Collected: 06/03/25 0721    Specimen: Blood Updated: 06/03/25 0730     Extra Tube Hold for add-ons.     Comment: Auto resulted.                I reviewed the patient's new clinical results.    Assessment & Plan       Dyspnea  Acute congestive heart failure  Elevated blood pressure  Sinus tachycardia  Obesity  Elevated creatinine    Clinical picture is concerning for cardiomyopathy.  He also has elevated creatinine.  Echocardiogram is pending.  Renal ultrasound is pending.  Will give IV beta-blocker to address sinus Tachycardia and elevated blood pressure.  He is diuresing well after his first dose of IV Lasix this morning.  Further plans are pending this initial cardiac workup.  Will need outpatient sleep study.      CODE STATUS:  Code status (Patient has no pulse and is not breathing):  CPR (Attempt to Resuscitate)  Medical Interventions (Patient has pulse or is breathing):  Full Support  Level of Support Discussed with:  Patient    Admission Status:  I believe this patient meets observation status    Expected length of stay:  1 midnights or greater    I discussed the patient's findings and my recommendations with patient.     Julia Lutz MD  06/03/25  09:46 EDT

## 2025-06-03 NOTE — Clinical Note
Level of Care: Telemetry [5]   Admitting Physician: INDU CAMPOS [[id number]]   Attending Physician: INDU CAMPOS [[id number]]

## 2025-06-03 NOTE — PLAN OF CARE
Problem: Adult Inpatient Plan of Care  Goal: Plan of Care Review  Outcome: Progressing  Goal: Patient-Specific Goal (Individualized)  Outcome: Progressing  Goal: Absence of Hospital-Acquired Illness or Injury  Outcome: Progressing  Intervention: Identify and Manage Fall Risk  Recent Flowsheet Documentation  Taken 6/3/2025 1600 by Shamika Garcia RN  Safety Promotion/Fall Prevention: safety round/check completed  Taken 6/3/2025 1407 by Shamika Garcia RN  Safety Promotion/Fall Prevention: safety round/check completed  Intervention: Prevent Skin Injury  Recent Flowsheet Documentation  Taken 6/3/2025 1407 by Shamika Garcia RN  Body Position: position changed independently  Skin Protection: pulse oximeter probe site changed  Intervention: Prevent and Manage VTE (Venous Thromboembolism) Risk  Recent Flowsheet Documentation  Taken 6/3/2025 1407 by Shamika Garcia RN  VTE Prevention/Management:   bilateral   SCDs (sequential compression devices) off   patient refused intervention  Goal: Optimal Comfort and Wellbeing  Outcome: Progressing  Intervention: Provide Person-Centered Care  Recent Flowsheet Documentation  Taken 6/3/2025 1407 by Shamika Garcia RN  Trust Relationship/Rapport:   care explained   choices provided   thoughts/feelings acknowledged   reassurance provided   questions encouraged  Goal: Readiness for Transition of Care  Outcome: Progressing     Problem: Pneumonia  Goal: Fluid Balance  Outcome: Progressing  Goal: Absence of Infection Signs and Symptoms  Outcome: Progressing  Goal: Effective Oxygenation and Ventilation  Outcome: Progressing  Intervention: Promote Airway Secretion Clearance  Recent Flowsheet Documentation  Taken 6/3/2025 1407 by Shamika Garcia RN  Activity Management: up ad edgar  Cough And Deep Breathing: done independently per patient   Goal Outcome Evaluation:

## 2025-06-03 NOTE — CASE MANAGEMENT/SOCIAL WORK
Discharge Planning Assessment   Joey     Patient Name: Twan Santos  MRN: [id number]  Today's Date: 6/3/2025    Admit Date: 6/3/2025    Plan: From Home with Famiy; Watch for [place] O2   Discharge Needs Assessment       Row Name 06/03/25 1341       Living Environment    People in Home child(darline), dependent;spouse    Name(s) of People in Home Airam    Current Living Arrangements home    Potentially Unsafe Housing Conditions none    In the past 12 months has the electric, gas, oil, or water company threatened to shut off services in your home? No    Primary Care Provided by self    Provides Primary Care For child(darline)    Family Caregiver if Needed spouse    Family Caregiver Names Airam    Quality of Family Relationships supportive    Able to Return to Prior Arrangements yes    Living Arrangement Comments Home with Family       Resource/Environmental Concerns    Resource/Environmental Concerns none    Transportation Concerns none       Transportation Needs    In the past 12 months, has lack of transportation kept you from medical appointments or from getting medications? no    In the past 12 months, has lack of transportation kept you from meetings, work, or from getting things needed for daily living? No       Food Insecurity    Within the past 12 months, you worried that your food would run out before you got the money to buy more. Never true    Within the past 12 months, the food you bought just didn't last and you didn't have money to get more. Never true       Transition Planning    Patient/Family Anticipates Transition to home    Patient/Family Anticipated Services at Transition none    Transportation Anticipated family or friend will provide       Discharge Needs Assessment    Readmission Within the Last 30 Days no previous admission in last 30 days    Equipment Currently Used at Home none    Concerns to be Addressed discharge planning    Do you want help finding or keeping work or a job? I do not need or  want help    Do you want help with school or training? For example, starting or completing job training or getting a high school diploma, GED or equivalent No    Anticipated Changes Related to Illness none    Equipment Needed After Discharge none    Provided Post Acute Provider List? N/A    Provided Post Acute Provider Quality & Resource List? N/A    Offered/Gave Vendor List no                   Discharge Plan       Row Name 06/03/25 1342       Plan    Plan From Home with Famiy    Plan Comments Met with pt at bedside, cofirmed PCP and enrolled in M2B. States he is independent with ADLs and IADLs, denies financial concerns and family will provide dc transportation. [facility] to screen for insurance. Currently requiring O2 at 2l with RA as baseline. Discussed dc plan, declines HH or SNF needs at this time, anticipates home with family.                                                                                     DC Barriers: Cardiology consult; [facility]                   Demographic Summary       Row Name 06/03/25 1341       General Information    Admission Type observation    Arrived From home    Referral Source emergency department    Reason for Consult discharge planning    Preferred Language English              Functional Status       Row Name 06/03/25 1341       Functional Status    Usual Activity Tolerance excellent    Current Activity Tolerance good       Physical Activity    On average, how many days per week do you engage in moderate to strenuous exercise (like a brisk walk)? 0 days    On average, how many minutes do you engage in exercise at this level? 0 min    Number of minutes of exercise per week 0       Assessment of Health Literacy    How often do you have someone help you read hospital materials? Occasionally    How often do you have problems learning about your medical condition because of difficulty understanding written information? Occasionally    How often do you have a problem understanding  what is told to you about your medical condition? Occasionally    How confident are you filling out medical forms by yourself? Quite a bit    Health Literacy Good       Functional Status, IADL    Medications independent    Meal Preparation independent    Housekeeping independent    Laundry independent    Shopping independent    If for any reason you need help with day-to-day activities such as bathing, preparing meals, shopping, managing finances, etc., do you get the help you need? I don't need any help       Mental Status    General Appearance WDL WDL       Mental Status Summary    Recent Changes in Mental Status/Cognitive Functioning no changes           Met with patient in room wearing PPE: mask    Maintained distance greater than six feet and spent less than 15 minutes in the room    Inga Moctezuma RN    Phone [phone]  Fax [phone]

## 2025-06-03 NOTE — ED PROVIDER NOTES
Subjective   History of Present Illness  Patient is a 35-year-old white male with no significant medical history presents today with complaints of shortness of breath.  He reports has been ongoing for about a month but progressively getting worse.  He has been seen by his primary care provider.  Initially was told that he may have some pneumonia and was prescribed a Z-Ramos which he finished.  He states he had a chest x-ray last week and was told it was okay.  He reports worsening shortness of breath.  Reports it is worse when he tries to lay on his right side at night.  Does report occasional cough.  He reports some sweats but denies fever or chills.  He does report some mild chest tightness but no significant chest pain.  He denies any leg pain or swelling.  Denies recent travel or prolonged immobilization.  No abdominal pain vomiting diarrhea black or bloody stools.  He denies any known ill contacts.      Review of Systems   Constitutional:  Positive for diaphoresis. Negative for fever.   HENT:  Negative for congestion.    Respiratory:  Positive for cough, chest tightness and shortness of breath.    Cardiovascular:  Negative for chest pain and leg swelling.   Gastrointestinal:  Negative for abdominal pain, diarrhea, nausea and vomiting.       No past medical history on file.    No Known Allergies    No past surgical history on file.    No family history on file.    Social History     Socioeconomic History    Marital status: Single           Objective   Physical Exam  Vital signs and triage nurse note reviewed.  Constitutional: Awake, alert; well-developed and well-nourished. No acute distress is noted.  HEENT: Normocephalic, atraumatic; pupils are PERRL with intact EOM; oropharynx is pink and moist without exudate or erythema.  No drooling or pooling of oral secretions.  Neck: Supple  Cardiovascular: Tachycardic rate and rhythm, normal S1-S2.  No murmur noted.  Pulmonary: Respiratory effort regular nonlabored,  breath sounds clear to auscultation all fields.  Abdomen: Soft, nontender, nondistended with normoactive bowel sounds; no rebound or guarding.  Musculoskeletal: Independent range of motion of all extremities with no palpable tenderness or edema.   Skin: Flesh tone, warm, dry, intact; no erythematous or petechial rash or lesion.    Procedures           ED Course        Labs Reviewed   COMPREHENSIVE METABOLIC PANEL - Abnormal; Notable for the following components:       Result Value    Glucose 199 (*)     Creatinine 1.28 (*)     Sodium 135 (*)     CO2 21.1 (*)     ALT (SGPT) 64 (*)     AST (SGOT) 41 (*)     All other components within normal limits    Narrative:     GFR Categories in Chronic Kidney Disease (CKD)              GFR Category          GFR (mL/min/1.73)    Interpretation  G1                    90 or greater        Normal or high (1)  G2                    60-89                Mild decrease (1)  G3a                   45-59                Mild to moderate decrease  G3b                   30-44                Moderate to severe decrease  G4                    15-29                Severe decrease  G5                    14 or less           Kidney failure    (1)In the absence of evidence of kidney disease, neither GFR category G1 or G2 fulfill the criteria for CKD.    eGFR calculation 2021 CKD-EPI creatinine equation, which does not include race as a factor   TROPONIN - Abnormal; Notable for the following components:    HS Troponin T 23 (*)     All other components within normal limits    Narrative:     High Sensitive Troponin T Reference Range:  <14.0 ng/L- Negative Female for AMI  <22.0 ng/L- Negative Male for AMI  >=14 - Abnormal Female indicating possible myocardial injury.  >=22 - Abnormal Male indicating possible myocardial injury.   Clinicians would have to utilize clinical acumen, EKG, Troponin, and serial changes to determine if it is an Acute Myocardial Infarction or myocardial injury due to an  underlying chronic condition.        BNP (IN-HOUSE) - Abnormal; Notable for the following components:    proBNP 807.0 (*)     All other components within normal limits    Narrative:     This assay is used as an aid in the diagnosis of individuals suspected of having heart failure. It can be used as an aid in the diagnosis of acute decompensated heart failure (ADHF) in patients presenting with signs and symptoms of ADHF to the emergency department (ED). In addition, NT-proBNP of <300 pg/mL indicates ADHF is not likely.    Age Range Result Interpretation  NT-proBNP Concentration (pg/mL:      <50             Positive            >450                   Gray                 300-450                    Negative             <300    50-75           Positive            >900                  Gray                300-900                  Negative            <300      >75             Positive            >1800                  Gray                300-1800                  Negative            <300   CBC WITH AUTO DIFFERENTIAL - Abnormal; Notable for the following components:    MPV 12.1 (*)     All other components within normal limits   RESPIRATORY PANEL PCR W/ COVID-19 (SARS-COV-2), NP SWAB IN UTM/VTP, 2 HR TAT - Normal    Narrative:     In the setting of a positive respiratory panel with a viral infection PLUS a negative procalcitonin without other underlying concern for bacterial infection, consider observing off antibiotics or discontinuation of antibiotics and continue supportive care. If the respiratory panel is positive for atypical bacterial infection (Bordetella pertussis, Chlamydophila pneumoniae, or Mycoplasma pneumoniae), consider antibiotic de-escalation to target atypical bacterial infection.   PROTIME-INR - Normal   APTT - Normal   PROCALCITONIN - Normal    Narrative:     As a Marker for Sepsis (Non-Neonates):    1. <0.5 ng/mL represents a low risk of severe sepsis and/or septic shock.  2. >2 ng/mL represents a high  "risk of severe sepsis and/or septic shock.    As a Marker for Lower Respiratory Tract Infections that require antibiotic therapy:    PCT on Admission    Antibiotic Therapy       6-12 Hrs later    >0.5                Strongly Recommended  >0.25 - <0.5        Recommended   0.1 - 0.25          Discouraged              Remeasure/reassess PCT  <0.1                Strongly Discouraged     Remeasure/reassess PCT    As 28 day mortality risk marker: \"Change in Procalcitonin Result\" (>80% or <=80%) if Day 0 (or Day 1) and Day 4 values are available. Refer to [email or web address]    Change in PCT <=80%  A decrease of PCT levels below or equal to 80% defines a positive change in PCT test result representing a higher risk for 28-day all-cause mortality of patients diagnosed with severe sepsis for septic shock.    Change in PCT >80%  A decrease of PCT levels of more than 80% defines a negative change in PCT result representing a lower risk for 28-day all-cause mortality of patients diagnosed with severe sepsis or septic shock.      MAGNESIUM - Normal   TSH RFX ON ABNORMAL TO FREE T4 - Normal   HIGH SENSITIVITIY TROPONIN T 1HR   CBC AND DIFFERENTIAL    Narrative:     The following orders were created for panel order CBC & Differential.  Procedure                               Abnormality         Status                     ---------                               -----------         ------                     CBC Auto Differential[[id number]]        Abnormal            Final result                 Please view results for these tests on the individual orders.   EXTRA TUBES    Narrative:     The following orders were created for panel order Extra Tubes.  Procedure                               Abnormality         Status                     ---------                               -----------         ------                     Gold Top - SST[[id number]]                                   Final result                 Please " view results for these tests on the individual orders.   GOLD TOP - [facility]     CT Angiogram Chest Pulmonary Embolism  Result Date: 6/3/2025  Impression: 1.No evidence of pulmonary embolus. 2.Widespread groundglass attenuation of the lungs without lobar consolidation. Few scattered areas of more dense airspace disease most pronounced in the lingula. Findings are most suggestive of infectious process in particular viral or atypical pathogen.  Noninfectious pulmonary edema is a consideration as well. Correlate with symptoms. 3.Small bilateral pleural effusions. 4.Benign 2.4 cm left adrenal adenoma. Electronically Signed: Klye Gongora MD  6/3/2025 7:55 AM EDT  Workstation ID: [id number]    Medications   sodium chloride 0.9 % flush 10 mL (has no administration in time range)   sodium chloride 0.9 % flush 10 mL (has no administration in time range)   sodium chloride 0.9 % flush 10 mL (has no administration in time range)   sodium chloride 0.9 % infusion 40 mL (has no administration in time range)   nitroglycerin (NITROSTAT) SL tablet 0.4 mg (has no administration in time range)   Potassium Replacement - Follow Nurse / BPA Driven Protocol (has no administration in time range)   Magnesium Standard Dose Replacement - Follow Nurse / BPA Driven Protocol (has no administration in time range)   Phosphorus Replacement - Follow Nurse / BPA Driven Protocol (has no administration in time range)   Calcium Replacement - Follow Nurse / BPA Driven Protocol (has no administration in time range)   acetaminophen (TYLENOL) tablet 650 mg (has no administration in time range)     Or   acetaminophen (TYLENOL) 160 MG/5ML oral solution 650 mg (has no administration in time range)     Or   acetaminophen (TYLENOL) suppository 650 mg (has no administration in time range)   sennosides-docusate (PERICOLACE) 8.6-50 MG per tablet 2 tablet (has no administration in time range)     And   polyethylene glycol (MIRALAX) packet 17 g (has no administration in  time range)     And   bisacodyl (DULCOLAX) EC tablet 5 mg (has no administration in time range)     And   bisacodyl (DULCOLAX) suppository 10 mg (has no administration in time range)   ondansetron ODT (ZOFRAN-ODT) disintegrating tablet 4 mg (has no administration in time range)     Or   ondansetron (ZOFRAN) injection 4 mg (has no administration in time range)   iopamidol (ISOVUE-370) 76 % injection 100 mL (100 mL Intravenous Given 6/3/25 0783)   furosemide (LASIX) injection 80 mg (80 mg Intravenous Given 6/3/25 2175)                                                    Medical Decision Making  Patient presents today with the above complaint.    He had above exam and evaluation.  He is placed on continuous cardiac monitor.  IV was established.  Labs EKG were obtained.    Workup: EKG was independently interpreted by Dr. Veras and reviewed by myself shows sinus tachycardia with ventricular rate of 131, possible left ventricular hypertrophy, no ectopy.  CBC is unremarkable with normal white blood cell count, normal hemoglobin.  Metabolic panel significant for glucose of 199, creatinine 1.28, ALT 64 and AST 41.  TSH and magnesium both within normal limits.  Procalcitonin within normal limits at 0.05.  Initial troponin 23.  proBNP 807.  Respiratory panel is negative.  CT PE protocol shows no evidence for pulmonary embolism.  Widespread groundglass attenuation in the lungs without lobar consolidation.  Few scattered areas of more dense airspace disease most pronounced in the lingula.  Findings suggestive of infectious process in particular viral or atypical pathogen.  Noninfectious pulmonary edema is consideration as well.  Small bilateral pleural effusions.    Patient is given a dose of Lasix.    On reexamination he is resting quietly no distress.  Heart rate remains in the 120s.  Blood pressure remains mildly elevated.  He did have an episode of hypoxia which is send O2 saturation dropped into the 80s.  He is placed on  nasal cannula 2 L.    Findings were discussed with him.  He will be admitted to the hospital for further management.  Case was discussed with Cristiana nurse practitioner on-call for [employer].      Problems Addressed:  Acute kidney injury: complicated acute illness or injury  Dyspnea, unspecified type: complicated acute illness or injury  Ground glass opacity present on imaging of lung: complicated acute illness or injury  Pleural effusion, bilateral: complicated acute illness or injury    Amount and/or Complexity of Data Reviewed  Labs: ordered.  Radiology: ordered.    Risk  Prescription drug management.  Decision regarding hospitalization.        Final diagnoses:   Dyspnea, unspecified type   Ground glass opacity present on imaging of lung   Pleural effusion, bilateral   Acute kidney injury       ED Disposition  ED Disposition       ED Disposition   Decision to Admit    Condition   --    Comment   Level of Care: Telemetry [5]   Diagnosis: Dyspnea [[id number]]                 No follow-up provider specified.       Medication List      No changes were made to your prescriptions during this visit.            Dionne Batista, OWEN  06/03/25 0893

## 2025-06-03 NOTE — CONSULTS
Cardiology Consult Note    Patient Identification:  Name: Twan Santos  Age: 35 y.o.  Sex: male  :  1990  MRN: [id number]             Requesting Physician :  Dr. Julia Lutz    Reason for Consultation / Chief Complaint :   dyspnea    History of Present Illness:      Twan Santos is a 33 year old male with a past history of:    -prediabetes on metformin  -nonsmoker  -no prior cardiac history    Who presented to the ED with a 1 month history of shortness of breath. Patient reports that initially he started with some dyspnea back in 2025. He thought it was allergy related. He went to urgent care and was placed on a Z-chris and albuterol inhaler with some relief of symptoms. He still felt poorly and went to his PCP. Now patient reports he has had more dyspnea particularly with exertion. He feels his right side is worse than his left.     Workup in the ED shows serial troponin of 23 and 32, , Cr 1.28, TSH normal.  CT chest shows no PE, groundglass appearance of lungs without lobular consolidation, suggestive of infectious process viral or atypical pathogen. Small bilateral pleural effusions.  EKG sinus tachycardia.  Echocardiogram EF severely reduced 19%, LV severely dilated, LVDF c/w grade III fixed restrictive pattern, severe MR    Patient received 1 dose of lasix 80mg IV and 10mg IV labetalol for blood pressure.    Electronically signed by OWEN Dominguez, 25, 4:49 PM EDT.    Cardiology attending addendum :    I have personally performed a face-to-face diagnostic evaluation, physical exam and reviewed data on this patient.  I have reviewed documentation done by me and nurse practitioner  and corrected as needed.  And agree with the different components of documentation.Greater than 50% of the time spent in the care of this patient was provided by attending consultant/me.         Assessment:  :    Acute HFrEF due to systolic dysfunction of unclear etiology  Cardiomyopathy  Severe mitral  regurgitation  Sinus tachycardia  Prediabetes  Obesity with BMI of 30  Renal insufficiency      Recommendations / Plan:        Patient presented with 1 month duration of shortness of breath and dyspnea on exertion, not relieved by conservative care including antibiotics, presented to ER.  Workup revealed serial troponins of 23 and 32, BNP elevated at 800 and creatinine 1.28.  CT chest showed groundglass appearance of lung and small bilateral pleural effusions.  EKG reviewed by me from 6/3/2025 reveals sinus tachycardia with rate of 131 beats per.  Echocardiogram revealed severe LV dysfunction EF of 19% with restrictive pattern and severe MR.  Patient is presenting with LV dysfunction and CHF and MR of unclear etiology.  Diuresis with intravenous Lasix as tolerated  Monitor function and urine output and electrolytes.  Beta-blockers  Patient would benefit from cardiac care to rule out acute ischemia as a cause of cardiomyopathy.  Will benefit from guideline directed medical therapy with metoprolol succinate, Entresto, furosemide as tolerated  If ischemic cardiomyopathy is ruled out most probably patient has viral cardiomyopathy with chronic leg illnesses starting in February of patient developing LV dysfunction since that  Will follow and consider further evaluation and treatment      Cardiographics  ECG: EKG tracing was  personally reviewed/interpreted by me  ECG 12 Lead Dyspnea   Preliminary Result   HEART YIXI=261  bpm   RR Yjbnaewe=802  ms   CO Nnllpjhg=505  ms   P Horizontal Axis=0  deg   P Front Axis=56  deg   QRSD Interval=86  ms   QT Mjgoflwt=636  ms   BHqX=159  ms   QRS Axis=-12  deg   T Wave Axis=44  deg   - BORDERLINE ECG -   Sinus tachycardia   Consider left ventricular hypertrophy   Date and Time of Study:2025-06-03 07:03:46      Telemetry Scan   Final Result      Telemetry Scan   Final Result          Telemetry: Sinus tachycardia             Diagnosis Plan   1. Dyspnea, unspecified type        2. Ground  glass opacity present on imaging of lung        3. Pleural effusion, bilateral        4. Acute kidney injury                             Past Medical History:  History reviewed. No pertinent past medical history.  Past Surgical History:  History reviewed. No pertinent surgical history.   Allergies:  No Known Allergies  Home Meds:  (Not in a hospital admission)    Current Meds:     Current Facility-Administered Medications:     acetaminophen (TYLENOL) tablet 650 mg, 650 mg, Oral, Q4H PRN **OR** acetaminophen (TYLENOL) 160 MG/5ML oral solution 650 mg, 650 mg, Oral, Q4H PRN **OR** acetaminophen (TYLENOL) suppository 650 mg, 650 mg, Rectal, Q4H PRN, Cristiana Aguero, APRN    sennosides-docusate (PERICOLACE) 8.6-50 MG per tablet 2 tablet, 2 tablet, Oral, BID PRN **AND** polyethylene glycol (MIRALAX) packet 17 g, 17 g, Oral, Daily PRN **AND** bisacodyl (DULCOLAX) EC tablet 5 mg, 5 mg, Oral, Daily PRN **AND** bisacodyl (DULCOLAX) suppository 10 mg, 10 mg, Rectal, Daily PRN, Cristiana Aguero, APRN    Calcium Replacement - Follow Nurse / BPA Driven Protocol, , Not Applicable, PRN, Cristiana Aguero, APRN    Magnesium Standard Dose Replacement - Follow Nurse / BPA Driven Protocol, , Not Applicable, PRN, Cristiana Aguero, APRN    nitroglycerin (NITROSTAT) SL tablet 0.4 mg, 0.4 mg, Sublingual, Q5 Min PRN, Cristiana Aguero, APRN    ondansetron ODT (ZOFRAN-ODT) disintegrating tablet 4 mg, 4 mg, Oral, Q6H PRN **OR** ondansetron (ZOFRAN) injection 4 mg, 4 mg, Intravenous, Q6H PRN, Cristiana Aguero, APRN    Phosphorus Replacement - Follow Nurse / BPA Driven Protocol, , Not Applicable, PRN, Cristiana Aguero, APRN    Potassium Replacement - Follow Nurse / BPA Driven Protocol, , Not Applicable, PRN, Cristiana Aguero, APRN    [COMPLETED] Insert Peripheral IV, , , Once **AND** sodium chloride 0.9 % flush 10 mL, 10 mL, Intravenous, PRN, Dionne Batista, APRN    sodium chloride 0.9 % flush 10 mL, 10 mL, Intravenous, Q12H, Cristiana Aguero, APRN, 10  mL at 06/03/25 1338    sodium chloride 0.9 % flush 10 mL, 10 mL, Intravenous, PRN, [place], Cristiana M, APRN    sodium chloride 0.9 % infusion 40 mL, 40 mL, Intravenous, PRN, [place], Cristiana M, APRN    Current Outpatient Medications:     albuterol sulfate  (90 Base) MCG/ACT inhaler, Inhale 1 puff Every 4 (Four) to 6 (Six) Hours As Needed for Wheezing or Shortness of Air., Disp: , Rfl:     dextromethorphan polistirex ER (DELSYM) 30 MG/5ML Suspension Extended Release oral suspension, Take 10 mL by mouth 2 (Two) Times a Day As Needed (cough)., Disp: , Rfl:     glipizide (GLUCOTROL XL) 5 MG ER tablet, Take 1 tablet by mouth Daily., Disp: , Rfl:     metFORMIN (GLUCOPHAGE) 500 MG tablet, Take 2 tablets by mouth 2 (Two) Times a Day With Meals., Disp: , Rfl:     oxyCODONE (ROXICODONE) 10 MG tablet, Take 0.5-1 tablets by mouth 3 (Three) Times a Day As Needed for Moderate Pain or Severe Pain., Disp: , Rfl:     Testosterone Cypionate 200 MG/ML solution, Inject 1.5 mL into the appropriate muscle as directed by prescriber Every 7 (Seven) Days., Disp: , Rfl:   Social History:   Social History     Tobacco Use    Smoking status: Never    Smokeless tobacco: Never   Substance Use Topics    Alcohol use: Not Currently      Family History:  History reviewed. No pertinent family history.     Review of Systems : Review of Systems   Constitutional: Positive for malaise/fatigue. Negative for fever.   Cardiovascular:  Positive for dyspnea on exertion and orthopnea. Negative for leg swelling and palpitations.   Respiratory:  Positive for shortness of breath and sleep disturbances due to breathing.    Neurological:  Negative for dizziness and weakness.   All other systems reviewed and are negative.           Constitutional:  Temp:  [97.3 °F (36.3 °C)-97.6 °F (36.4 °C)] 97.6 °F (36.4 °C)  Heart Rate:  [] 108  Resp:  [15-34] 21  BP: (101-155)/() 116/87    Physical Exam   /87 (BP Location: Left arm, Patient Position: Sitting)   " Pulse 108   Temp 97.6 °F (36.4 °C) (Oral)   Resp 21   Ht 180.3 cm (71\")   Wt 113 kg (248 lb 14.4 oz)   SpO2 95%   BMI 34.71 kg/m²   Physical Exam  General:  Appears in no acute distress  Eyes: Sclerae are anicteric,  conjunctivae are clear   HEENT:  No JVD. Thyroid not visibly enlarged. No mucosal pallor or cyanosis  Respiratory: Respirations regular and unlabored at rest.  Diminished breath sounds R>L, No crackles, rubs or wheezes auscultated  Cardiovascular: S1,S2 Regular rhythm, tachycardia. No murmur, rub or gallop auscultated. No pretibial pitting edema  Gastrointestinal: Abdomen nondistended.  Musculoskeletal:  No abnormal movements  Extremities: No digital clubbing or cyanosis  Skin: Color pink. Skin warm and dry to touch.   Neuro: Alert and awake, no lateralizing deficits appreciated        Echocardiogram:   Results for orders placed during the hospital encounter of 06/03/25    Adult Transthoracic Echo Complete W/ Cont if Necessary Per Protocol    Interpretation Summary    Left ventricular systolic function is severely decreased. Calculated left ventricular EF = 19.1%    The left ventricular cavity is moderate to severely dilated.    Left ventricular diastolic function is consistent with (grade III w/high LAP) fixed restrictive pattern.  Average GLS -2.8%.    Moderately reduced right ventricular systolic function noted.    The right ventricular cavity is dilated.    Left atrial volume is moderately increased.    Abnormal mitral valve structure consistent with dilated annulus.    Severe mitral valve regurgitation is present.    Estimated right ventricular systolic pressure from tricuspid regurgitation is mildly elevated (35-45 mmHg).      STRESS TEST        Cardiolite (Tc-99m sestamibi) stress test    HEART CATHETERIZATION  No results found for this or any previous visit.        Imaging  Chest X-ray:   Imaging Results (Last 24 Hours)       Procedure Component Value Units Date/Time    US Renal " Bilateral [[id number]] Collected: 06/03/25 1027     Updated: 06/03/25 1031    Narrative:      US RENAL BILATERAL    Date of Exam: 6/3/2025 10:02 AM EDT    Indication: DAYAMI.    Comparison: No comparisons available.    Technique: Grayscale and color Doppler ultrasound evaluation of the kidneys and urinary bladder was performed.    FINDINGS:      The right kidney measures 10.3 cm in length and demonstrates normal cortical echogenicity.    The left kidney measures 11.3 cm in length and demonstrates normal cortical echogenicity.    No hydronephrosis or renal calculi identified.    The urinary bladder is partially distended with urine and appears unremarkable.        Impression:      1.No hydronephrosis or renal calculi identified.      Electronically Signed: Richy Leung MD    6/3/2025 10:29 AM EDT    Workstation ID: [id number]    CT Angiogram Chest Pulmonary Embolism [[id number]] Collected: 06/03/25 0751     Updated: 06/03/25 0757    Narrative:      CT ANGIOGRAM CHEST PULMONARY EMBOLISM    Date of Exam: 6/3/2025 7:25 AM EDT    Indication: dyspnea/tachycardia/hypoxia.    Comparison: None available.    Technique: Axial CT images were obtained of the chest after the uneventful intravenous administration of iodinated contrast utilizing pulmonary embolism protocol.  In addition, a 3-D volume rendered image was created for interpretation.  Sagittal and   coronal reconstructions were performed.  Automated exposure control and iterative reconstruction methods were used.      Findings:  PULMONARY VASCULATURE: Pulmonary arteries are widely patent without evidence of embolus.  Main pulmonary artery is normal in size. No evidence of right heart strain.    MEDIASTINUM:Unremarkable. Aortic and heart size are normal. No aortic dissection identified. No mass nor pericardial effusion.  CORONARY ARTERIES: No calcified atherosclerotic disease.  LUNGS: Widespread groundglass attenuation of the lungs without lobar consolidation. Few scattered  areas of more dense airspace disease most pronounced in the lingula. Findings are most suggestive of infectious process in particular viral or atypical   pathogen. Correlate with history and symptoms. Noninfectious pulmonary edema is a consideration as well. Correlate with symptoms.   PLEURAL SPACE: There are small bilateral pleural effusions.  LYMPH NODES: There are no pathologically enlarged lymph nodes.    UPPER ABDOMEN: There is a benign 2.4 cm left adrenal adenoma.    OSSEOUS STRUCTURES: Appropriate for age with no acute process identified.      Impression:      Impression:  1.No evidence of pulmonary embolus.  2.Widespread groundglass attenuation of the lungs without lobar consolidation. Few scattered areas of more dense airspace disease most pronounced in the lingula. Findings are most suggestive of infectious process in particular viral or atypical pathogen.   Noninfectious pulmonary edema is a consideration as well. Correlate with symptoms.  3.Small bilateral pleural effusions.  4.Benign 2.4 cm left adrenal adenoma.        Electronically Signed: Kyle Gongora MD    6/3/2025 7:55 AM EDT    Workstation ID: [id number]            Lab Review: I have reviewed the labs  Results from last 7 days   Lab Units 06/03/25  0832 06/03/25  0721   HSTROP T ng/L 32* 23*     Results from last 7 days   Lab Units 06/03/25  0721   MAGNESIUM mg/dL 2.0     Results from last 7 days   Lab Units 06/03/25  0721   SODIUM mmol/L 135*   POTASSIUM mmol/L 4.6   BUN mg/dL 9.1   CREATININE mg/dL 1.28*   CALCIUM mg/dL 9.2         Results from last 7 days   Lab Units 06/03/25  0721   PROBNP pg/mL 807.0*     Results from last 7 days   Lab Units 06/03/25  0721   WBC 10*3/mm3 9.24   HEMOGLOBIN g/dL 16.8   HEMATOCRIT % 49.8   PLATELETS 10*3/mm3 275     Results from last 7 days   Lab Units 06/03/25  0721   INR  1.07   APTT seconds 23.1             Davey Oliveira MD  6/3/2025, 18:19 EDT      EMR Dragon/Transcription:   Dictated utilizing  Brandin dictation

## 2025-06-04 PROBLEM — I42.9 CARDIOMYOPATHY: Chronic | Status: ACTIVE | Noted: 2025-06-03

## 2025-06-04 PROBLEM — I50.21 ACUTE HFREF (HEART FAILURE WITH REDUCED EJECTION FRACTION): Status: ACTIVE | Noted: 2025-06-03

## 2025-06-04 PROBLEM — E66.9 OBESITY (BMI 30-39.9): Chronic | Status: ACTIVE | Noted: 2025-06-03

## 2025-06-04 PROBLEM — I42.9 CARDIOMYOPATHY: Status: ACTIVE | Noted: 2025-06-03

## 2025-06-04 PROBLEM — I50.21 ACUTE HFREF (HEART FAILURE WITH REDUCED EJECTION FRACTION): Chronic | Status: ACTIVE | Noted: 2025-06-03

## 2025-06-04 LAB
ANION GAP SERPL CALCULATED.3IONS-SCNC: 13.3 MMOL/L (ref 5–15)
BASOPHILS # BLD AUTO: 0.02 10*3/MM3 (ref 0–0.2)
BASOPHILS NFR BLD AUTO: 0.3 % (ref 0–1.5)
BUN SERPL-MCNC: 11.5 MG/DL (ref 6–20)
BUN/CREAT SERPL: 9.1 (ref 7–25)
CALCIUM SPEC-SCNC: 9.1 MG/DL (ref 8.6–10.5)
CHLORIDE SERPL-SCNC: 100 MMOL/L (ref 98–107)
CO2 SERPL-SCNC: 24.7 MMOL/L (ref 22–29)
CREAT SERPL-MCNC: 1.26 MG/DL (ref 0.76–1.27)
DEPRECATED RDW RBC AUTO: 40 FL (ref 37–54)
EGFRCR SERPLBLD CKD-EPI 2021: 75.8 ML/MIN/1.73
EOSINOPHIL # BLD AUTO: 0.09 10*3/MM3 (ref 0–0.4)
EOSINOPHIL NFR BLD AUTO: 1.3 % (ref 0.3–6.2)
ERYTHROCYTE [DISTWIDTH] IN BLOOD BY AUTOMATED COUNT: 12.6 % (ref 12.3–15.4)
GLUCOSE SERPL-MCNC: 165 MG/DL (ref 65–99)
HCT VFR BLD AUTO: 48.6 % (ref 37.5–51)
HGB BLD-MCNC: 16.3 G/DL (ref 13–17.7)
IMM GRANULOCYTES # BLD AUTO: 0.02 10*3/MM3 (ref 0–0.05)
IMM GRANULOCYTES NFR BLD AUTO: 0.3 % (ref 0–0.5)
LYMPHOCYTES # BLD AUTO: 1.51 10*3/MM3 (ref 0.7–3.1)
LYMPHOCYTES NFR BLD AUTO: 21.4 % (ref 19.6–45.3)
MCH RBC QN AUTO: 29.1 PG (ref 26.6–33)
MCHC RBC AUTO-ENTMCNC: 33.5 G/DL (ref 31.5–35.7)
MCV RBC AUTO: 86.8 FL (ref 79–97)
MONOCYTES # BLD AUTO: 0.56 10*3/MM3 (ref 0.1–0.9)
MONOCYTES NFR BLD AUTO: 7.9 % (ref 5–12)
NEUTROPHILS NFR BLD AUTO: 4.86 10*3/MM3 (ref 1.7–7)
NEUTROPHILS NFR BLD AUTO: 68.8 % (ref 42.7–76)
NRBC BLD AUTO-RTO: 0 /100 WBC (ref 0–0.2)
PLATELET # BLD AUTO: 263 10*3/MM3 (ref 140–450)
PMV BLD AUTO: 11.5 FL (ref 6–12)
POTASSIUM SERPL-SCNC: 3.9 MMOL/L (ref 3.5–5.2)
QT INTERVAL: 311 MS
QTC INTERVAL: 461 MS
RBC # BLD AUTO: 5.6 10*6/MM3 (ref 4.14–5.8)
SODIUM SERPL-SCNC: 138 MMOL/L (ref 136–145)
WBC NRBC COR # BLD AUTO: 7.06 10*3/MM3 (ref 3.4–10.8)

## 2025-06-04 PROCEDURE — 80048 BASIC METABOLIC PNL TOTAL CA: CPT | Performed by: INTERNAL MEDICINE

## 2025-06-04 PROCEDURE — B41F1ZZ FLUOROSCOPY OF RIGHT LOWER EXTREMITY ARTERIES USING LOW OSMOLAR CONTRAST: ICD-10-PCS | Performed by: INTERNAL MEDICINE

## 2025-06-04 PROCEDURE — 99152 MOD SED SAME PHYS/QHP 5/>YRS: CPT

## 2025-06-04 PROCEDURE — 99232 SBSQ HOSP IP/OBS MODERATE 35: CPT | Performed by: INTERNAL MEDICINE

## 2025-06-04 PROCEDURE — 25010000002 FUROSEMIDE PER 20 MG: Performed by: INTERNAL MEDICINE

## 2025-06-04 PROCEDURE — 93458 L HRT ARTERY/VENTRICLE ANGIO: CPT | Performed by: INTERNAL MEDICINE

## 2025-06-04 PROCEDURE — 25010000002 MIDAZOLAM PER 1 MG: Performed by: INTERNAL MEDICINE

## 2025-06-04 PROCEDURE — 25510000001 IOPAMIDOL PER 1 ML: Performed by: INTERNAL MEDICINE

## 2025-06-04 PROCEDURE — C1760 CLOSURE DEV, VASC: HCPCS | Performed by: INTERNAL MEDICINE

## 2025-06-04 PROCEDURE — 25810000003 SODIUM CHLORIDE 0.9 % SOLUTION: Performed by: INTERNAL MEDICINE

## 2025-06-04 PROCEDURE — C1769 GUIDE WIRE: HCPCS | Performed by: INTERNAL MEDICINE

## 2025-06-04 PROCEDURE — 25010000002 LIDOCAINE 2% SOLUTION: Performed by: INTERNAL MEDICINE

## 2025-06-04 PROCEDURE — 4A023N7 MEASUREMENT OF CARDIAC SAMPLING AND PRESSURE, LEFT HEART, PERCUTANEOUS APPROACH: ICD-10-PCS | Performed by: INTERNAL MEDICINE

## 2025-06-04 PROCEDURE — 85025 COMPLETE CBC W/AUTO DIFF WBC: CPT | Performed by: INTERNAL MEDICINE

## 2025-06-04 PROCEDURE — B2111ZZ FLUOROSCOPY OF MULTIPLE CORONARY ARTERIES USING LOW OSMOLAR CONTRAST: ICD-10-PCS | Performed by: INTERNAL MEDICINE

## 2025-06-04 PROCEDURE — B2151ZZ FLUOROSCOPY OF LEFT HEART USING LOW OSMOLAR CONTRAST: ICD-10-PCS | Performed by: INTERNAL MEDICINE

## 2025-06-04 PROCEDURE — C1894 INTRO/SHEATH, NON-LASER: HCPCS | Performed by: INTERNAL MEDICINE

## 2025-06-04 PROCEDURE — 25010000002 FENTANYL CITRATE (PF) 100 MCG/2ML SOLUTION: Performed by: INTERNAL MEDICINE

## 2025-06-04 RX ORDER — SODIUM CHLORIDE 9 MG/ML
35 INJECTION, SOLUTION INTRAVENOUS CONTINUOUS
Status: DISPENSED | OUTPATIENT
Start: 2025-06-04 | End: 2025-06-04

## 2025-06-04 RX ORDER — FENTANYL CITRATE 50 UG/ML
INJECTION, SOLUTION INTRAMUSCULAR; INTRAVENOUS
Status: DISCONTINUED | OUTPATIENT
Start: 2025-06-04 | End: 2025-06-04 | Stop reason: HOSPADM

## 2025-06-04 RX ORDER — ACETAMINOPHEN 325 MG/1
650 TABLET ORAL EVERY 4 HOURS PRN
Status: DISCONTINUED | OUTPATIENT
Start: 2025-06-04 | End: 2025-06-04 | Stop reason: SDUPTHER

## 2025-06-04 RX ORDER — MIDAZOLAM HYDROCHLORIDE 1 MG/ML
INJECTION, SOLUTION INTRAMUSCULAR; INTRAVENOUS
Status: DISCONTINUED | OUTPATIENT
Start: 2025-06-04 | End: 2025-06-04 | Stop reason: HOSPADM

## 2025-06-04 RX ORDER — LIDOCAINE HYDROCHLORIDE 20 MG/ML
INJECTION, SOLUTION INFILTRATION; PERINEURAL
Status: DISCONTINUED | OUTPATIENT
Start: 2025-06-04 | End: 2025-06-04 | Stop reason: HOSPADM

## 2025-06-04 RX ORDER — IOPAMIDOL 755 MG/ML
INJECTION, SOLUTION INTRAVASCULAR
Status: DISCONTINUED | OUTPATIENT
Start: 2025-06-04 | End: 2025-06-04 | Stop reason: HOSPADM

## 2025-06-04 RX ADMIN — Medication 10 ML: at 20:16

## 2025-06-04 RX ADMIN — Medication 10 ML: at 09:00

## 2025-06-04 RX ADMIN — SODIUM CHLORIDE 35 ML/HR: 9 INJECTION, SOLUTION INTRAVENOUS at 15:35

## 2025-06-04 RX ADMIN — FUROSEMIDE 40 MG: 10 INJECTION, SOLUTION INTRAMUSCULAR; INTRAVENOUS at 08:49

## 2025-06-04 RX ADMIN — FUROSEMIDE 40 MG: 10 INJECTION, SOLUTION INTRAMUSCULAR; INTRAVENOUS at 20:16

## 2025-06-04 RX ADMIN — METOPROLOL SUCCINATE 12.5 MG: 25 TABLET, EXTENDED RELEASE ORAL at 20:16

## 2025-06-04 RX ADMIN — SACUBITRIL AND VALSARTAN 1 TABLET: 24; 26 TABLET, FILM COATED ORAL at 20:16

## 2025-06-04 RX ADMIN — SACUBITRIL AND VALSARTAN 1 TABLET: 24; 26 TABLET, FILM COATED ORAL at 15:40

## 2025-06-04 NOTE — PLAN OF CARE
Goal Outcome Evaluation:         Patient sleeping in between care. NPO since midnight for heart cath. Patient stable at this time.

## 2025-06-04 NOTE — PROGRESS NOTES
Cardiology Progress Note    Patient Identification:  Name: Twan Santos  Age: 35 y.o.  Sex: male  :  1990  MRN: [id number]                 Follow Up / Chief Complaint: CHF, cardiomyopathy  Chief Complaint   Patient presents with    Shortness of Breath       Interval History: Patient presented with complaint of shortness of breath, was diagnosed with acute congestive heart failure due to systolic dysfunction.       Subjective: Patient seen and examined.  Chart reviewed.  Labs reviewed.  Discussed with RN taking care of patient.  Patient denies any chest pain or shortness of breath today      Objective:  HS troponin 23-->32  2025: BUN/creatinine 11.51.26, glucose 165, normal CBC    History of present illness:      Twan Santos is a 33 year old male with a past history of:     -prediabetes on metformin  -nonsmoker  -no prior cardiac history     Who presented to the ED with a 1 month history of shortness of breath. Patient reports that initially he started with some dyspnea back in 2025. He thought it was allergy related. He went to urgent care and was placed on a Z-chris and albuterol inhaler with some relief of symptoms. He still felt poorly and went to his PCP. Now patient reports he has had more dyspnea particularly with exertion. He feels his right side is worse than his left.      Workup in the ED shows serial troponin of 23 and 32, , Cr 1.28, TSH normal.  CT chest shows no PE, groundglass appearance of lungs without lobular consolidation, suggestive of infectious process viral or atypical pathogen. Small bilateral pleural effusions.  EKG sinus tachycardia.  Echocardiogram EF severely reduced 19%, LV severely dilated, LVDF c/w grade III fixed restrictive pattern, severe MR     Patient received 1 dose of lasix 80mg IV and 10mg IV labetalol for blood pressure.        Assessment:  :     Acute HFrEF due to systolic dysfunction of unclear etiology  Cardiomyopathy  Severe mitral regurgitation  Sinus  tachycardia  Prediabetes  Obesity with BMI of 30  Renal insufficiency        Recommendations / Plan:         Patient presented with 1 month duration of shortness of breath and dyspnea on exertion, not relieved by conservative care including antibiotics, presented to ER.  Workup revealed serial troponins of 23 and 32, BNP elevated at 800 and creatinine 1.28.  CT chest showed groundglass appearance of lung and small bilateral pleural effusions.  EKG reviewed by me from 6/3/2025 reveals sinus tachycardia with rate of 131 beats per.  Echocardiogram revealed severe LV dysfunction EF of 19% with restrictive pattern and severe MR.  Patient is presenting with LV dysfunction and CHF and MR of unclear etiology.  Diuresis with intravenous Lasix as tolerated  Monitor function and urine output and electrolytes.  Beta-blockers  Patient underwent cardiac cath 6/4/2025 which revealed no obstructive CAD, severe LV enlargement with severe global left lower hypokinesis, LVEF of 10 to 15% with elevated LVEDP of 24.  Will start patient on metoprolol and Entresto.  Will monitor renal function  Will add Aldactone before discharge.  Since patient has severe LV dysfunction would benefit from LifeVest and follow-up and repeat LV function as outpatient        Copied text in this portion of the note has been reviewed and is accurate as of 6/4/2025    Past Medical History:  History reviewed. No pertinent past medical history.  Past Surgical History:  History reviewed. No pertinent surgical history.     Social History:   Social History     Tobacco Use    Smoking status: Never    Smokeless tobacco: Never   Substance Use Topics    Alcohol use: Not Currently      Family History:  History reviewed. No pertinent family history.       Allergies:  No Known Allergies  Scheduled Meds:  [Transfer Hold] furosemide, 40 mg, Q12H  metoprolol succinate XL, 12.5 mg, Q12H  sacubitril-valsartan, 1 tablet, Q12H  [Transfer Hold] sodium chloride, 10 mL,  "Q12H          Review of Systems:   ROS  Review of Systems   Constitution: Negative for chills and fever.   Cardiovascular: Negative for chest pain and palpitations.   Respiratory: Negative for cough and hemoptysis.    Gastrointestinal: Negative for nausea.        Constitutional:  Temp:  [97.3 °F (36.3 °C)-98.5 °F (36.9 °C)] 97.9 °F (36.6 °C)  Heart Rate:  [] 111  Resp:  [15-34] 24  BP: (101-151)/() 136/89    Physical Exam   /89   Pulse 111   Temp 97.9 °F (36.6 °C) (Oral)   Resp 24   Ht 180.3 cm (71\")   Wt 112 kg (246 lb 11.1 oz)   SpO2 91%   BMI 34.41 kg/m²   General:  Appears in no acute distress  Eyes: Sclera is anicteric,  conjunctiva is clear   HEENT:  No JVD. Thyroid not visibly enlarged. No mucosal pallor or cyanosis  Respiratory: Respirations regular and unlabored at rest.  Clear to auscultation  Cardiovascular: S1,S2 Regular rate and rhythm. No murmur, rub or gallop auscultated.  . No pretibial pitting edema  Gastrointestinal: Abdomen nondistended.  Musculoskeletal:  No abnormal movements  Extremities: No digital clubbing or cyanosis  Skin: Color pink.   Neuro: Alert and awake.    INTAKE AND OUTPUT:    Intake/Output Summary (Last 24 hours) at 6/4/2025 1309  Last data filed at 6/3/2025 2200  Gross per 24 hour   Intake --   Output 3800 ml   Net -3800 ml       Cardiographics  Telemetry: Reviewed and interpreted by me reveals sinus tachycardia    ECG:   ECG 12 Lead Dyspnea   Final Result   HEART BXAS=858  bpm   RR Lwgvbcpk=837  ms   MS Ppytvkle=701  ms   P Horizontal Axis=0  deg   P Front Axis=56  deg   QRSD Interval=86  ms   QT Oarpjkdz=037  ms   GLvG=268  ms   QRS Axis=-12  deg   T Wave Axis=44  deg   - BORDERLINE ECG -   Sinus tachycardia   Consider  left ventricular hypertrophy   When compared with ECG of 05-Mar-2019 10:41:17,   Significant change in rhythm   Significant axis, voltage or hypertrophy change   Electronically Signed By: Cheikh Veras ([facility]) 2025-06-04 07:29:13   Date and " Time of Study:2025-06-03 07:03:46      Telemetry Scan   Final Result      Telemetry Scan   Final Result      Telemetry Scan   Final Result      Telemetry Scan   Final Result      Telemetry Scan   Final Result        I have personally reviewed EKG    Echocardiogram: Results for orders placed during the hospital encounter of 06/03/25    Adult Transthoracic Echo Complete W/ Cont if Necessary Per Protocol    Interpretation Summary    Left ventricular systolic function is severely decreased. Calculated left ventricular EF = 19.1%    The left ventricular cavity is moderate to severely dilated.    Left ventricular diastolic function is consistent with (grade III w/high LAP) fixed restrictive pattern.  Average GLS -2.8%.    Moderately reduced right ventricular systolic function noted.    The right ventricular cavity is dilated.    Left atrial volume is moderately increased.    Abnormal mitral valve structure consistent with dilated annulus.    Severe mitral valve regurgitation is present.    Estimated right ventricular systolic pressure from tricuspid regurgitation is mildly elevated (35-45 mmHg).      STRESS TEST      HEART CATHETERIZATION  No results found for this or any previous visit.      Lab Review   I have reviewed the labs  Results from last 7 days   Lab Units 06/03/25  0832 06/03/25  0721   HSTROP T ng/L 32* 23*     Results from last 7 days   Lab Units 06/03/25  0721   MAGNESIUM mg/dL 2.0     Results from last 7 days   Lab Units 06/04/25  0400   SODIUM mmol/L 138   POTASSIUM mmol/L 3.9   BUN mg/dL 11.5   CREATININE mg/dL 1.26   CALCIUM mg/dL 9.1         Results from last 7 days   Lab Units 06/04/25  0400 06/03/25  0721   WBC 10*3/mm3 7.06 9.24   HEMOGLOBIN g/dL 16.3 16.8   HEMATOCRIT % 48.6 49.8   PLATELETS 10*3/mm3 263 275     Results from last 7 days   Lab Units 06/03/25  0721   INR  1.07   APTT seconds 23.1       RADIOLOGY:  Imaging Results (Last 24 Hours)       ** No results found for the last 24 hours. **  "                 )6/4/2025  MD LANNY Soliman/Transcription:   \"Dictated utilizing Dragon dictation\".   "

## 2025-06-04 NOTE — PLAN OF CARE
Problem: Adult Inpatient Plan of Care  Goal: Plan of Care Review  Outcome: Progressing  Flowsheets (Taken 6/4/2025 1707)  Plan of Care Reviewed With: patient  Goal: Patient-Specific Goal (Individualized)  Outcome: Progressing  Goal: Absence of Hospital-Acquired Illness or Injury  Outcome: Progressing  Intervention: Identify and Manage Fall Risk  Recent Flowsheet Documentation  Taken 6/4/2025 1630 by Rayne Herrera RN  Safety Promotion/Fall Prevention:   clutter free environment maintained   room organization consistent   safety round/check completed  Taken 6/4/2025 1213 by Rayne Herrera RN  Safety Promotion/Fall Prevention:   clutter free environment maintained   room organization consistent   safety round/check completed  Taken 6/4/2025 1003 by Rayne Herrera RN  Safety Promotion/Fall Prevention:   clutter free environment maintained   room organization consistent   safety round/check completed  Taken 6/4/2025 0849 by Rayne Herrera RN  Safety Promotion/Fall Prevention:   clutter free environment maintained   safety round/check completed   toileting scheduled  Intervention: Prevent Skin Injury  Recent Flowsheet Documentation  Taken 6/4/2025 0849 by Rayne Herrera RN  Body Position: sitting up in bed  Intervention: Prevent and Manage VTE (Venous Thromboembolism) Risk  Recent Flowsheet Documentation  Taken 6/4/2025 0849 by Rayne Herrera RN  VTE Prevention/Management: SCDs (sequential compression devices) off  Intervention: Prevent Infection  Recent Flowsheet Documentation  Taken 6/4/2025 1630 by Rayne Herrera RN  Infection Prevention: single patient room provided  Taken 6/4/2025 1213 by Rayne Herrera RN  Infection Prevention: single patient room provided  Taken 6/4/2025 1003 by Rayne Herrera RN  Infection Prevention: single patient room provided  Taken 6/4/2025 0849 by Rayne Herrera RN  Infection Prevention: single patient room provided  Goal: Optimal Comfort  and Wellbeing  Outcome: Progressing  Intervention: Provide Person-Centered Care  Recent Flowsheet Documentation  Taken 6/4/2025 0849 by Rayne Herrera, RN  Trust Relationship/Rapport:   care explained   choices provided   questions answered   questions encouraged  Goal: Readiness for Transition of Care  Outcome: Progressing     Problem: Pneumonia  Goal: Fluid Balance  Outcome: Progressing  Goal: Absence of Infection Signs and Symptoms  Outcome: Progressing  Goal: Effective Oxygenation and Ventilation  Outcome: Progressing  Intervention: Promote Airway Secretion Clearance  Recent Flowsheet Documentation  Taken 6/4/2025 0849 by Rayne Herrera RN  Activity Management: up ad edgar  Cough And Deep Breathing: done independently per patient  Intervention: Optimize Oxygenation and Ventilation  Recent Flowsheet Documentation  Taken 6/4/2025 0849 by Rayne Herrera, RN  Head of Bed (HOB) Positioning: HOB elevated   Goal Outcome Evaluation:  Plan of Care Reviewed With: patient

## 2025-06-04 NOTE — PROGRESS NOTES
LOS: 0 days   Patient Care Team:  Logan Del Rio MD as PCP - General    Subjective     Anxious for heart cath today, continues with mild dyspnea, worse with laying flat    Review of Systems   Constitutional:  Positive for activity change.   HENT: Negative.     Respiratory:  Positive for cough and shortness of breath.    Cardiovascular:  Negative for leg swelling.   Genitourinary: Negative.    Musculoskeletal: Negative.    Skin: Negative.    Neurological: Negative.    Psychiatric/Behavioral: Negative.             Objective     Vital Signs  Temp:  [97.3 °F (36.3 °C)-98.5 °F (36.9 °C)] 98.1 °F (36.7 °C)  Heart Rate:  [] 102  Resp:  [15-34] 24  BP: (101-152)/() 129/89      Physical Exam  HENT:      Head: Normocephalic and atraumatic.      Right Ear: External ear normal.      Left Ear: External ear normal.      Nose: Nose normal.      Mouth/Throat:      Mouth: Mucous membranes are moist.   Eyes:      General:         Right eye: No discharge.         Left eye: No discharge.   Cardiovascular:      Rate and Rhythm: Normal rate.   Pulmonary:      Effort: Pulmonary effort is normal.   Abdominal:      General: Bowel sounds are normal.   Musculoskeletal:         General: Normal range of motion.   Skin:     General: Skin is warm and dry.   Neurological:      Mental Status: He is alert and oriented to person, place, and time.   Psychiatric:         Mood and Affect: Mood normal.         Behavior: Behavior normal.              Results Review:    Lab Results (last 24 hours)       Procedure Component Value Units Date/Time    Basic Metabolic Panel [[id number]]  (Abnormal) Collected: 06/04/25 0400    Specimen: Blood from Arm, Left Updated: 06/04/25 0525     Glucose 165 mg/dL      BUN 11.5 mg/dL      Creatinine 1.26 mg/dL      Sodium 138 mmol/L      Potassium 3.9 mmol/L      Comment: Specimen hemolyzed.  Result may be falsely elevated.        Chloride 100 mmol/L      CO2 24.7 mmol/L      Calcium 9.1 mg/dL       BUN/Creatinine Ratio 9.1     Anion Gap 13.3 mmol/L      eGFR 75.8 mL/min/1.73     Narrative:      GFR Categories in Chronic Kidney Disease (CKD)              GFR Category          GFR (mL/min/1.73)    Interpretation  G1                    90 or greater        Normal or high (1)  G2                    60-89                Mild decrease (1)  G3a                   45-59                Mild to moderate decrease  G3b                   30-44                Moderate to severe decrease  G4                    15-29                Severe decrease  G5                    14 or less           Kidney failure    (1)In the absence of evidence of kidney disease, neither GFR category G1 or G2 fulfill the criteria for CKD.    eGFR calculation 2021 CKD-EPI creatinine equation, which does not include race as a factor    CBC & Differential [[id number]]  (Normal) Collected: 06/04/25 0400    Specimen: Blood from Arm, Left Updated: 06/04/25 0450    Narrative:      The following orders were created for panel order CBC & Differential.  Procedure                               Abnormality         Status                     ---------                               -----------         ------                     CBC Auto Differential[[id number]]        Normal              Final result                 Please view results for these tests on the individual orders.    CBC Auto Differential [[id number]]  (Normal) Collected: 06/04/25 0400    Specimen: Blood from Arm, Left Updated: 06/04/25 0450     WBC 7.06 10*3/mm3      RBC 5.60 10*6/mm3      Hemoglobin 16.3 g/dL      Hematocrit 48.6 %      MCV 86.8 fL      MCH 29.1 pg      MCHC 33.5 g/dL      RDW 12.6 %      RDW-SD 40.0 fl      MPV 11.5 fL      Platelets 263 10*3/mm3      Neutrophil % 68.8 %      Lymphocyte % 21.4 %      Monocyte % 7.9 %      Eosinophil % 1.3 %      Basophil % 0.3 %      Immature Grans % 0.3 %      Neutrophils, Absolute 4.86 10*3/mm3      Lymphocytes, Absolute 1.51 10*3/mm3       Monocytes, Absolute 0.56 10*3/mm3      Eosinophils, Absolute 0.09 10*3/mm3      Basophils, Absolute 0.02 10*3/mm3      Immature Grans, Absolute 0.02 10*3/mm3      nRBC 0.0 /100 WBC     TSH [[id number]]  (Normal) Collected: 06/03/25 0832    Specimen: Blood from Arm, Left Updated: 06/03/25 1102     TSH 1.860 uIU/mL     T4, Free [[id number]]  (Normal) Collected: 06/03/25 0832    Specimen: Blood from Arm, Left Updated: 06/03/25 1102     Free T4 1.49 ng/dL     High Sensitivity Troponin T 1Hr [[id number]]  (Abnormal) Collected: 06/03/25 0832    Specimen: Blood from Arm, Left Updated: 06/03/25 0900     HS Troponin T 32 ng/L      Troponin T Numeric Delta 9 ng/L      Troponin T % Delta 39    Narrative:      High Sensitive Troponin T Reference Range:  <14.0 ng/L- Negative Female for AMI  <22.0 ng/L- Negative Male for AMI  >=14 - Abnormal Female indicating possible myocardial injury.  >=22 - Abnormal Male indicating possible myocardial injury.   Clinicians would have to utilize clinical acumen, EKG, Troponin, and serial changes to determine if it is an Acute Myocardial Infarction or myocardial injury due to an underlying chronic condition.                  Imaging Results (Last 24 Hours)       Procedure Component Value Units Date/Time    US Renal Bilateral [[id number]] Collected: 06/03/25 1027     Updated: 06/03/25 1031    Narrative:      US RENAL BILATERAL    Date of Exam: 6/3/2025 10:02 AM EDT    Indication: DAYAMI.    Comparison: No comparisons available.    Technique: Grayscale and color Doppler ultrasound evaluation of the kidneys and urinary bladder was performed.    FINDINGS:      The right kidney measures 10.3 cm in length and demonstrates normal cortical echogenicity.    The left kidney measures 11.3 cm in length and demonstrates normal cortical echogenicity.    No hydronephrosis or renal calculi identified.    The urinary bladder is partially distended with urine and appears unremarkable.        Impression:      1.No  hydronephrosis or renal calculi identified.      Electronically Signed: Richy Leung MD    6/3/2025 10:29 AM EDT    Workstation ID: [id number]                 I reviewed the patient's new clinical results.    Medication Review:   Scheduled Meds:furosemide, 40 mg, Intravenous, Q12H  metoprolol succinate XL, 12.5 mg, Oral, Q12H  sodium chloride, 10 mL, Intravenous, Q12H      Continuous Infusions:   PRN Meds:.  acetaminophen **OR** acetaminophen **OR** acetaminophen    senna-docusate sodium **AND** polyethylene glycol **AND** bisacodyl **AND** bisacodyl    Calcium Replacement - Follow Nurse / BPA Driven Protocol    Magnesium Standard Dose Replacement - Follow Nurse / BPA Driven Protocol    nitroglycerin    ondansetron ODT **OR** ondansetron    Phosphorus Replacement - Follow Nurse / BPA Driven Protocol    Potassium Replacement - Follow Nurse / BPA Driven Protocol    [COMPLETED] Insert Peripheral IV **AND** sodium chloride    sodium chloride    sodium chloride     Interval History:    Assessment & Plan       Dyspnea    Sinus tachycardia    Elevated blood pressure reading    Acute kidney failure    Obesity (BMI 30-39.9)    -Echo noted with EF reduced 19%, cardiology planning heart catheterization today to rule out acute ischemia as cause of cardiomyopathy  -Continue home with BB, Entresto, and furosemide  - Patient sleep study     Diet: Healthy heart    CODE STATUS: Full code  Code status (Patient has no pulse and is not breathing):  Medical Interventions (Patient has pulse or is breathing):  Level of Support Discussed with : Patient    Admission Status: Observation    Expected length of Stay: 1 to 2 days    Plan for disposition: Home    OWEN Woodson  06/04/25  08:25 EDT

## 2025-06-04 NOTE — PHARMACY PATIENT ASSISTANCE
Transitions of Care (test claim):    Drug Entresto 24/26 Tab: 1 tab PO BID  Covered/PA Required: Covered without PA  Patient Cost Per Month: $861.26  Is the medication eligible for a trial card/copay card? Free trial available from         Per Care Manager notes, patient does not have health insurance. He was evaluated by [facility], and it was determined he was not eligible for Medicaid. The patient is eligible to use a one-time 30-day free trial card, and may be eligible to use a monthly discount card. The patient may apply for financial assistance from the drug company         Please note that when it states medication is eligible for a trial card that this only means that the medication has a free trial available. This does not assess if the patient has already utilized the once in a lifetime free trial.     This test claim coverage is only valid on the date the note is published. Copay/coverage could vary depending on patient coverage at a later date.  Additionally this test claim is for the sole purpose of a price check not a clinical recommendation.     For billing questions please call [facility] Pharmacist at ext: [phone]  For M2B questions please call [facility] at ext: [phone]      Omar Ortiz, Kat, BCPS

## 2025-06-04 NOTE — DISCHARGE INSTRUCTIONS
Post Cath Instructions  Drink plenty of water for the next 24 hours. This helps to eliminate the dye used in your procedure through urination.  You may resume a normal diet; however, try to avoid foods that would cause gas or constipation.     What to do for pain: acetaminophen (Tylenol), not ibuprofen (Advil) can be used for puncture site tenderness. If your pain is unmanageable with this method, call the Cardiologist's office.     Sedative medication (Anesthesia) given to you during your catheterization may decrease your judgement and reaction time for up to 24-48 hours.  Therefore:  DO NOT drive, operate hazardous machinery, or consume alcoholic beverages for 24 hours.   DO NOT make any important/legal decisions for 24 hours.   Have someone stay with you for at least 24 hours.    For the next 48 hours (to allow proper healing and prevent bleeding):  Avoid excessive bending at wound site  Avoid straining (anything that would tense up muscles around the affected puncture site)  Avoid lifting, pushing, or pulling objects greater than 5 pounds, for 5 days  For Arm Cases:  No flexing at the puncture site, such as hammering, golfing, bowling, or swinging any objects  For Groin Cases:  Refrain from running, vigorous walking, and sexual activity  No prolonged sitting or standing   Limit stair climbing as much as possible    Keep the puncture site clean and dry.  After 24 hours, remove the dressing and replace it with a Band-Aid for at least one additional day.  Gently clean the site with mild soap and water.  No scrubbing/rubbing, and lightly pat the area dry.  Showers are acceptable; however, avoid submerging in water (tub baths, hot tubs, pools, dishwater, etc…) for at least one week.  The site should be completely healed before resuming these activities to reduce the risk of infection. Watch for signs and symptoms of infection and notify the physician of any of the following:  Bleeding or an increase in swelling,  redness, or warmth at the puncture site  Fever  Increased soreness around puncture site  Foul odor or significant drainage from the puncture site  **A bruise or small “pea sized” lump under the skin at the puncture site is not unusual.  This should disappear within 3-4 weeks.**    CONTACT YOUR PHYSICIAN OR CALL 911 IF YOU EXPERIENCE ANY OF THE FOLLOWING:  Increased angina (chest pain) or frequent sensations of pressure, burning, pain, or other discomfort in the chest, arm, jaws, or stomach  Lightheadedness, dizziness, faint feeling, sweating, or difficulty breathing  Odd changes in sedation (like numbness, tingling, coldness, or pain) or color (pale/bluish) in the arm or leg in which the catheter was inserted.    IMPORTANT:  Although this occurs very rarely, if you should develop bright red or excessive bleeding, feel a “pop” inside at the insertion site, or notice a sudden increase in swelling larger than a walnut, you should call 911.  Hold continuous firm pressure to the access site until emergency personnel arrive.  It is best if someone else can do this for you.

## 2025-06-04 NOTE — DISCHARGE PLACEMENT REQUEST
"Twan Santos (35 y.o. Male)       Date of Birth   1990    Social Security Number       Address   [address] [place] [address] [place] [address]    Home Phone   [phone]    MRN   [id number]       Denominational   None    Marital Status   Single                            Admission Date   6/3/2025    Admission Type   Emergency    Admitting Provider   Julia Lutz MD    Attending Provider   Julia Lutz MD    Department, Room/Bed   [facility], Pool/[facility]       Discharge Date       Discharge Disposition       Discharge Destination                                 Attending Provider: Julia Lutz MD    Allergies: No Known Allergies    Isolation: None   Infection: None   Code Status: CPR    Ht: 180.3 cm (71\")   Wt: 112 kg (246 lb 11.1 oz)    Admission Cmt: None   Principal Problem: Dyspnea [R06.00]                   Active Insurance as of 6/3/2025       Patient has no active insurance coverage on file for 6/3/2025.            Emergency Contacts        (Rel.) Home Phone Work Phone Mobile Phone    Airam Santos (Spouse) [phone] -- [phone]                 History & Physical        Julia Lutz MD at 06/03/25 0939              Patient Care Team:  Logan Del Rio MD as PCP - General    Chief complaint shortness of breath    Subjective    Patient is a 35 y.o. male with no significant past medical history who presents with progressive shortness of breath x 1 month.  He has a dry cough.  He was treated with Zithromax about a month ago when symptoms first started.  His shortness of breath got progressively worse.  He now has orthopnea and has not slept the last 2 nights because of his difficulty breathing.  He had a chest x-ray done last Friday at his PCP office that was read as normal.  Has an upcoming appointment with respiratory therapy.  He does not smoke vape or use any other substances.  He has no history of asthma.  There has been no leg edema.  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    Review of Systems   Constitutional:  Positive for activity change and fatigue. Negative for appetite change, chills, diaphoresis, fever and unexpected weight change.   HENT:  Negative for congestion, facial swelling, hearing loss and postnasal drip.    Eyes:  Negative for visual disturbance.   Respiratory:  Positive for cough and shortness of breath. Negative for wheezing and stridor.    Gastrointestinal:  Negative for abdominal pain, constipation, diarrhea, nausea and vomiting.   Genitourinary:  Negative for dysuria.   Musculoskeletal:  Negative for arthralgias, back pain and gait problem.   Skin:  Negative for rash.   Neurological:  Negative for tremors, syncope and weakness.   Psychiatric/Behavioral:  Negative for confusion.           History  No past medical history on file.  No past surgical history on file.  No family history on file.     (Not in a hospital admission)    Allergies:  Patient has no known allergies.    Objective    Vital Signs  Temp:  [97.3 °F (36.3 °C)] 97.3 °F (36.3 °C)  Heart Rate:  [103-133] 123  Resp:  [20] 20  BP: (127-155)/() 144/112     Physical Exam:      General Appearance:    Alert, cooperative, in no acute distress, pleasant   Head:    Normocephalic, without obvious abnormality, atraumatic   Eyes:            Lids and lashes normal, conjunctivae and sclerae normal, no   icterus, no pallor, corneas clear, PERRLA   Ears:    Ears appear intact with no abnormalities noted   Throat:   No oral lesions, no thrush, oral mucosa moist   Neck:   No adenopathy, supple, trachea midline, no thyromegaly, no   carotid bruit, no JVD   Lungs:   Bibasilar crackles    Heart:    Regular rhythm and normal rate, normal S1 and S2, no            murmur, no gallop, no rub, no click   Chest Wall:    No abnormalities observed   Abdomen:     Normal bowel sounds, no masses, no organomegaly, soft        non-tender, non-distended, no guarding, no  rebound                tenderness   Extremities:   Moves all extremities well, no edema, no cyanosis, no             redness   Pulses:   Pulses palpable and equal bilaterally   Skin:   No bleeding, bruising or rash   Lymph nodes:   No palpable adenopathy   Neurologic:   Cranial nerves 2 - 12 grossly intact, sensation intact, DTR       present and equal bilaterally       Results Review:     Imaging Results (Last 24 Hours)       Procedure Component Value Units Date/Time    CT Angiogram Chest Pulmonary Embolism [[id number]] Collected: 06/03/25 0751     Updated: 06/03/25 0757    Narrative:      CT ANGIOGRAM CHEST PULMONARY EMBOLISM    Date of Exam: 6/3/2025 7:25 AM EDT    Indication: dyspnea/tachycardia/hypoxia.    Comparison: None available.    Technique: Axial CT images were obtained of the chest after the uneventful intravenous administration of iodinated contrast utilizing pulmonary embolism protocol.  In addition, a 3-D volume rendered image was created for interpretation.  Sagittal and   coronal reconstructions were performed.  Automated exposure control and iterative reconstruction methods were used.      Findings:  PULMONARY VASCULATURE: Pulmonary arteries are widely patent without evidence of embolus.  Main pulmonary artery is normal in size. No evidence of right heart strain.    MEDIASTINUM:Unremarkable. Aortic and heart size are normal. No aortic dissection identified. No mass nor pericardial effusion.  CORONARY ARTERIES: No calcified atherosclerotic disease.  LUNGS: Widespread groundglass attenuation of the lungs without lobar consolidation. Few scattered areas of more dense airspace disease most pronounced in the lingula. Findings are most suggestive of infectious process in particular viral or atypical   pathogen. Correlate with history and symptoms. Noninfectious pulmonary edema is a consideration as well. Correlate with symptoms.   PLEURAL SPACE: There are small bilateral pleural effusions.  LYMPH NODES:  There are no pathologically enlarged lymph nodes.    UPPER ABDOMEN: There is a benign 2.4 cm left adrenal adenoma.    OSSEOUS STRUCTURES: Appropriate for age with no acute process identified.      Impression:      Impression:  1.No evidence of pulmonary embolus.  2.Widespread groundglass attenuation of the lungs without lobar consolidation. Few scattered areas of more dense airspace disease most pronounced in the lingula. Findings are most suggestive of infectious process in particular viral or atypical pathogen.   Noninfectious pulmonary edema is a consideration as well. Correlate with symptoms.  3.Small bilateral pleural effusions.  4.Benign 2.4 cm left adrenal adenoma.        Electronically Signed: Kyle Gongora MD    6/3/2025 7:55 AM EDT    Workstation ID: [id number]             Lab Results (last 24 hours)       Procedure Component Value Units Date/Time    High Sensitivity Troponin T 1Hr [[id number]]  (Abnormal) Collected: 06/03/25 0832    Specimen: Blood from Arm, Left Updated: 06/03/25 0900     HS Troponin T 32 ng/L      Troponin T Numeric Delta 9 ng/L      Troponin T % Delta 39    Narrative:      High Sensitive Troponin T Reference Range:  <14.0 ng/L- Negative Female for AMI  <22.0 ng/L- Negative Male for AMI  >=14 - Abnormal Female indicating possible myocardial injury.  >=22 - Abnormal Male indicating possible myocardial injury.   Clinicians would have to utilize clinical acumen, EKG, Troponin, and serial changes to determine if it is an Acute Myocardial Infarction or myocardial injury due to an underlying chronic condition.         Respiratory Panel PCR w/COVID-19(SARS-CoV-2) REMA/CLARE/LILY/PAD/COR/SANJAY In-House, NP Swab in [facility], 2 HR TAT - Swab, Nasopharynx [[id number]]  (Normal) Collected: 06/03/25 0721    Specimen: Swab from Nasopharynx Updated: 06/03/25 0815     ADENOVIRUS, PCR Not Detected     Coronavirus 229E Not Detected     Coronavirus HKU1 Not Detected     Coronavirus NL63 Not Detected     Coronavirus  OC43 Not Detected     COVID19 Not Detected     Human Metapneumovirus Not Detected     Human Rhinovirus/Enterovirus Not Detected     Influenza A PCR Not Detected     Influenza B PCR Not Detected     Parainfluenza Virus 1 Not Detected     Parainfluenza Virus 2 Not Detected     Parainfluenza Virus 3 Not Detected     Parainfluenza Virus 4 Not Detected     RSV, PCR Not Detected     Bordetella pertussis pcr Not Detected     Bordetella parapertussis PCR Not Detected     Chlamydophila pneumoniae PCR Not Detected     Mycoplasma pneumo by PCR Not Detected    Narrative:      In the setting of a positive respiratory panel with a viral infection PLUS a negative procalcitonin without other underlying concern for bacterial infection, consider observing off antibiotics or discontinuation of antibiotics and continue supportive care. If the respiratory panel is positive for atypical bacterial infection (Bordetella pertussis, Chlamydophila pneumoniae, or Mycoplasma pneumoniae), consider antibiotic de-escalation to target atypical bacterial infection.    Comprehensive Metabolic Panel [[id number]]  (Abnormal) Collected: 06/03/25 0721    Specimen: Blood Updated: 06/03/25 0801     Glucose 199 mg/dL      BUN 9.1 mg/dL      Creatinine 1.28 mg/dL      Sodium 135 mmol/L      Potassium 4.6 mmol/L      Comment: Specimen hemolyzed.  Result may be falsely elevated.        Chloride 100 mmol/L      CO2 21.1 mmol/L      Calcium 9.2 mg/dL      Total Protein 7.1 g/dL      Albumin 4.5 g/dL      ALT (SGPT) 64 U/L      AST (SGOT) 41 U/L      Alkaline Phosphatase 48 U/L      Total Bilirubin 0.8 mg/dL      Globulin 2.6 gm/dL      A/G Ratio 1.7 g/dL      BUN/Creatinine Ratio 7.1     Anion Gap 13.9 mmol/L      eGFR 74.4 mL/min/1.73     Narrative:      GFR Categories in Chronic Kidney Disease (CKD)              GFR Category          GFR (mL/min/1.73)    Interpretation  G1                    90 or greater        Normal or high (1)  G2                    60-89                 Mild decrease (1)  G3a                   45-59                Mild to moderate decrease  G3b                   30-44                Moderate to severe decrease  G4                    15-29                Severe decrease  G5                    14 or less           Kidney failure    (1)In the absence of evidence of kidney disease, neither GFR category G1 or G2 fulfill the criteria for CKD.    eGFR calculation 2021 CKD-EPI creatinine equation, which does not include race as a factor    Magnesium [[id number]]  (Normal) Collected: 06/03/25 0721    Specimen: Blood Updated: 06/03/25 0801     Magnesium 2.0 mg/dL     High Sensitivity Troponin T [[id number]]  (Abnormal) Collected: 06/03/25 0721    Specimen: Blood Updated: 06/03/25 0757     HS Troponin T 23 ng/L     Narrative:      High Sensitive Troponin T Reference Range:  <14.0 ng/L- Negative Female for AMI  <22.0 ng/L- Negative Male for AMI  >=14 - Abnormal Female indicating possible myocardial injury.  >=22 - Abnormal Male indicating possible myocardial injury.   Clinicians would have to utilize clinical acumen, EKG, Troponin, and serial changes to determine if it is an Acute Myocardial Infarction or myocardial injury due to an underlying chronic condition.         BNP [[id number]]  (Abnormal) Collected: 06/03/25 0721    Specimen: Blood Updated: 06/03/25 0757     proBNP 807.0 pg/mL     Narrative:      This assay is used as an aid in the diagnosis of individuals suspected of having heart failure. It can be used as an aid in the diagnosis of acute decompensated heart failure (ADHF) in patients presenting with signs and symptoms of ADHF to the emergency department (ED). In addition, NT-proBNP of <300 pg/mL indicates ADHF is not likely.    Age Range Result Interpretation  NT-proBNP Concentration (pg/mL:      <50             Positive            >450                   Gray                 300-450                    Negative             <300    50-75           Positive  "           >900                  Gray                300-900                  Negative            <300      >75             Positive            >1800                  Gray                300-1800                  Negative            <300    Procalcitonin [[id number]]  (Normal) Collected: 06/03/25 0721    Specimen: Blood Updated: 06/03/25 0757     Procalcitonin 0.05 ng/mL     Narrative:      As a Marker for Sepsis (Non-Neonates):    1. <0.5 ng/mL represents a low risk of severe sepsis and/or septic shock.  2. >2 ng/mL represents a high risk of severe sepsis and/or septic shock.    As a Marker for Lower Respiratory Tract Infections that require antibiotic therapy:    PCT on Admission    Antibiotic Therapy       6-12 Hrs later    >0.5                Strongly Recommended  >0.25 - <0.5        Recommended   0.1 - 0.25          Discouraged              Remeasure/reassess PCT  <0.1                Strongly Discouraged     Remeasure/reassess PCT    As 28 day mortality risk marker: \"Change in Procalcitonin Result\" (>80% or <=80%) if Day 0 (or Day 1) and Day 4 values are available. Refer to http://www.[employer] ASAs-pct-calculator.com    Change in PCT <=80%  A decrease of PCT levels below or equal to 80% defines a positive change in PCT test result representing a higher risk for 28-day all-cause mortality of patients diagnosed with severe sepsis for septic shock.    Change in PCT >80%  A decrease of PCT levels of more than 80% defines a negative change in PCT result representing a lower risk for 28-day all-cause mortality of patients diagnosed with severe sepsis or septic shock.       TSH Rfx On Abnormal To Free T4 [[id number]]  (Normal) Collected: 06/03/25 0721    Specimen: Blood Updated: 06/03/25 0757     TSH 1.990 uIU/mL     CBC & Differential [[id number]]  (Abnormal) Collected: 06/03/25 0721    Specimen: Blood Updated: 06/03/25 0754    Narrative:      The following orders were created for panel order CBC & Differential.  Procedure     "                           Abnormality         Status                     ---------                               -----------         ------                     CBC Auto Differential[[id number]]        Abnormal            Final result                 Please view results for these tests on the individual orders.    CBC Auto Differential [[id number]]  (Abnormal) Collected: 06/03/25 0721    Specimen: Blood Updated: 06/03/25 0754     WBC 9.24 10*3/mm3      RBC 5.78 10*6/mm3      Hemoglobin 16.8 g/dL      Hematocrit 49.8 %      MCV 86.2 fL      MCH 29.1 pg      MCHC 33.7 g/dL      RDW 12.7 %      RDW-SD 39.7 fl      MPV 12.1 fL      Platelets 275 10*3/mm3      Neutrophil % 71.8 %      Lymphocyte % 20.1 %      Monocyte % 6.7 %      Eosinophil % 0.8 %      Basophil % 0.3 %      Immature Grans % 0.3 %      Neutrophils, Absolute 6.63 10*3/mm3      Lymphocytes, Absolute 1.86 10*3/mm3      Monocytes, Absolute 0.62 10*3/mm3      Eosinophils, Absolute 0.07 10*3/mm3      Basophils, Absolute 0.03 10*3/mm3      Immature Grans, Absolute 0.03 10*3/mm3      nRBC 0.0 /100 WBC     Protime-INR [[id number]]  (Normal) Collected: 06/03/25 0721    Specimen: Blood Updated: 06/03/25 0740     Protime 13.9 Seconds      INR 1.07    aPTT [[id number]]  (Normal) Collected: 06/03/25 0721    Specimen: Blood Updated: 06/03/25 0740     PTT 23.1 seconds     Extra Tubes [[id number]] Collected: 06/03/25 0721    Specimen: Blood, Venous Line Updated: 06/03/25 0730    Narrative:      The following orders were created for panel order Extra Tubes.  Procedure                               Abnormality         Status                     ---------                               -----------         ------                     Gold Top - SST[[id number]]                                   Final result                 Please view results for these tests on the individual orders.    Gold Top - SST [[id number]] Collected: 06/03/25 0721    Specimen: Blood Updated: 06/03/25 0730      Extra Tube Hold for add-ons.     Comment: Auto resulted.                I reviewed the patient's new clinical results.    Assessment & Plan      Dyspnea  Acute congestive heart failure  Elevated blood pressure  Sinus tachycardia  Obesity  Elevated creatinine    Clinical picture is concerning for cardiomyopathy.  He also has elevated creatinine.  Echocardiogram is pending.  Renal ultrasound is pending.  Will give IV beta-blocker to address sinus Tachycardia and elevated blood pressure.  He is diuresing well after his first dose of IV Lasix this morning.  Further plans are pending this initial cardiac workup.  Will need outpatient sleep study.      CODE STATUS:  Code status (Patient has no pulse and is not breathing):  CPR (Attempt to Resuscitate)  Medical Interventions (Patient has pulse or is breathing):  Full Support  Level of Support Discussed with:  Patient    Admission Status:  I believe this patient meets observation status    Expected length of stay:  1 midnights or greater    I discussed the patient's findings and my recommendations with patient.     Julia Lutz MD  06/03/25  09:46 EDT        Electronically signed by Julia Lutz MD at 06/03/25 0950          Physician Progress Notes (last 24 hours)        Milly Wylie APRN at 06/04/25 0825               LOS: 0 days   Patient Care Team:  Logan Del Rio MD as PCP - General    Subjective     Anxious for heart cath today, continues with mild dyspnea, worse with laying flat    Review of Systems   Constitutional:  Positive for activity change.   HENT: Negative.     Respiratory:  Positive for cough and shortness of breath.    Cardiovascular:  Negative for leg swelling.   Genitourinary: Negative.    Musculoskeletal: Negative.    Skin: Negative.    Neurological: Negative.    Psychiatric/Behavioral: Negative.             Objective     Vital Signs  Temp:  [97.3 °F (36.3 °C)-98.5 °F (36.9 °C)] 98.1 °F (36.7 °C)  Heart Rate:  [] 102  Resp:  [15-34]  24  BP: (101-152)/() 129/89      Physical Exam  HENT:      Head: Normocephalic and atraumatic.      Right Ear: External ear normal.      Left Ear: External ear normal.      Nose: Nose normal.      Mouth/Throat:      Mouth: Mucous membranes are moist.   Eyes:      General:         Right eye: No discharge.         Left eye: No discharge.   Cardiovascular:      Rate and Rhythm: Normal rate.   Pulmonary:      Effort: Pulmonary effort is normal.   Abdominal:      General: Bowel sounds are normal.   Musculoskeletal:         General: Normal range of motion.   Skin:     General: Skin is warm and dry.   Neurological:      Mental Status: He is alert and oriented to person, place, and time.   Psychiatric:         Mood and Affect: Mood normal.         Behavior: Behavior normal.              Results Review:    Lab Results (last 24 hours)       Procedure Component Value Units Date/Time    Basic Metabolic Panel [[id number]]  (Abnormal) Collected: 06/04/25 0400    Specimen: Blood from Arm, Left Updated: 06/04/25 0525     Glucose 165 mg/dL      BUN 11.5 mg/dL      Creatinine 1.26 mg/dL      Sodium 138 mmol/L      Potassium 3.9 mmol/L      Comment: Specimen hemolyzed.  Result may be falsely elevated.        Chloride 100 mmol/L      CO2 24.7 mmol/L      Calcium 9.1 mg/dL      BUN/Creatinine Ratio 9.1     Anion Gap 13.3 mmol/L      eGFR 75.8 mL/min/1.73     Narrative:      GFR Categories in Chronic Kidney Disease (CKD)              GFR Category          GFR (mL/min/1.73)    Interpretation  G1                    90 or greater        Normal or high (1)  G2                    60-89                Mild decrease (1)  G3a                   45-59                Mild to moderate decrease  G3b                   30-44                Moderate to severe decrease  G4                    15-29                Severe decrease  G5                    14 or less           Kidney failure    (1)In the absence of evidence of kidney disease, neither GFR  category G1 or G2 fulfill the criteria for CKD.    eGFR calculation 2021 CKD-EPI creatinine equation, which does not include race as a factor    CBC & Differential [[id number]]  (Normal) Collected: 06/04/25 0400    Specimen: Blood from Arm, Left Updated: 06/04/25 0450    Narrative:      The following orders were created for panel order CBC & Differential.  Procedure                               Abnormality         Status                     ---------                               -----------         ------                     CBC Auto Differential[[id number]]        Normal              Final result                 Please view results for these tests on the individual orders.    CBC Auto Differential [[id number]]  (Normal) Collected: 06/04/25 0400    Specimen: Blood from Arm, Left Updated: 06/04/25 0450     WBC 7.06 10*3/mm3      RBC 5.60 10*6/mm3      Hemoglobin 16.3 g/dL      Hematocrit 48.6 %      MCV 86.8 fL      MCH 29.1 pg      MCHC 33.5 g/dL      RDW 12.6 %      RDW-SD 40.0 fl      MPV 11.5 fL      Platelets 263 10*3/mm3      Neutrophil % 68.8 %      Lymphocyte % 21.4 %      Monocyte % 7.9 %      Eosinophil % 1.3 %      Basophil % 0.3 %      Immature Grans % 0.3 %      Neutrophils, Absolute 4.86 10*3/mm3      Lymphocytes, Absolute 1.51 10*3/mm3      Monocytes, Absolute 0.56 10*3/mm3      Eosinophils, Absolute 0.09 10*3/mm3      Basophils, Absolute 0.02 10*3/mm3      Immature Grans, Absolute 0.02 10*3/mm3      nRBC 0.0 /100 WBC     TSH [[id number]]  (Normal) Collected: 06/03/25 0832    Specimen: Blood from Arm, Left Updated: 06/03/25 1102     TSH 1.860 uIU/mL     T4, Free [[id number]]  (Normal) Collected: 06/03/25 0832    Specimen: Blood from Arm, Left Updated: 06/03/25 1102     Free T4 1.49 ng/dL     High Sensitivity Troponin T 1Hr [[id number]]  (Abnormal) Collected: 06/03/25 0832    Specimen: Blood from Arm, Left Updated: 06/03/25 0900     HS Troponin T 32 ng/L      Troponin T Numeric Delta 9 ng/L      Troponin T  % Delta 39    Narrative:      High Sensitive Troponin T Reference Range:  <14.0 ng/L- Negative Female for AMI  <22.0 ng/L- Negative Male for AMI  >=14 - Abnormal Female indicating possible myocardial injury.  >=22 - Abnormal Male indicating possible myocardial injury.   Clinicians would have to utilize clinical acumen, EKG, Troponin, and serial changes to determine if it is an Acute Myocardial Infarction or myocardial injury due to an underlying chronic condition.                  Imaging Results (Last 24 Hours)       Procedure Component Value Units Date/Time    US Renal Bilateral [[id number]] Collected: 06/03/25 1027     Updated: 06/03/25 1031    Narrative:      US RENAL BILATERAL    Date of Exam: 6/3/2025 10:02 AM EDT    Indication: DAYAMI.    Comparison: No comparisons available.    Technique: Grayscale and color Doppler ultrasound evaluation of the kidneys and urinary bladder was performed.    FINDINGS:      The right kidney measures 10.3 cm in length and demonstrates normal cortical echogenicity.    The left kidney measures 11.3 cm in length and demonstrates normal cortical echogenicity.    No hydronephrosis or renal calculi identified.    The urinary bladder is partially distended with urine and appears unremarkable.        Impression:      1.No hydronephrosis or renal calculi identified.      Electronically Signed: Richy Leung MD    6/3/2025 10:29 AM EDT    Workstation ID: [id number]                 I reviewed the patient's new clinical results.    Medication Review:   Scheduled Meds:furosemide, 40 mg, Intravenous, Q12H  metoprolol succinate XL, 12.5 mg, Oral, Q12H  sodium chloride, 10 mL, Intravenous, Q12H      Continuous Infusions:   PRN Meds:.  acetaminophen **OR** acetaminophen **OR** acetaminophen    senna-docusate sodium **AND** polyethylene glycol **AND** bisacodyl **AND** bisacodyl    Calcium Replacement - Follow Nurse / BPA Driven Protocol    Magnesium Standard Dose Replacement - Follow Nurse / BPA  Driven Protocol    nitroglycerin    ondansetron ODT **OR** ondansetron    Phosphorus Replacement - Follow Nurse / BPA Driven Protocol    Potassium Replacement - Follow Nurse / BPA Driven Protocol    [COMPLETED] Insert Peripheral IV **AND** sodium chloride    sodium chloride    sodium chloride     Interval History:    Assessment & Plan       Dyspnea    Sinus tachycardia    Elevated blood pressure reading    Acute kidney failure    Obesity (BMI 30-39.9)    -Echo noted with EF reduced 19%, cardiology planning heart catheterization today to rule out acute ischemia as cause of cardiomyopathy  -Continue home with BB, Entresto, and furosemide  - Patient sleep study     Diet: Healthy heart    CODE STATUS: Full code  Code status (Patient has no pulse and is not breathing):  Medical Interventions (Patient has pulse or is breathing):  Level of Support Discussed with : Patient    Admission Status: Observation    Expected length of Stay: 1 to 2 days    Plan for disposition: Home    OWEN Woodson  25  08:25 EDT          Electronically signed by Milly Wylie APRN at 25 1335       Davey Oliveira MD at 25 0735            Cardiology Progress Note    Patient Identification:  Name: Twan Santos  Age: 35 y.o.  Sex: male  :  1990  MRN: [id number]                 Follow Up / Chief Complaint: CHF, cardiomyopathy  Chief Complaint   Patient presents with    Shortness of Breath       Interval History: Patient presented with complaint of shortness of breath, was diagnosed with acute congestive heart failure due to systolic dysfunction.       Subjective: Patient seen and examined.  Chart reviewed.  Labs reviewed.  Discussed with RN taking care of patient.  Patient denies any chest pain or shortness of breath today      Objective:  HS troponin 23-->32  2025: BUN/creatinine 11.51.26, glucose 165, normal CBC    History of present illness:      Twan Santos is a 33 year old male with a past history  of:     -prediabetes on metformin  -nonsmoker  -no prior cardiac history     Who presented to the ED with a 1 month history of shortness of breath. Patient reports that initially he started with some dyspnea back in March 2025. He thought it was allergy related. He went to urgent care and was placed on a Z-chris and albuterol inhaler with some relief of symptoms. He still felt poorly and went to his PCP. Now patient reports he has had more dyspnea particularly with exertion. He feels his right side is worse than his left.      Workup in the ED shows serial troponin of 23 and 32, , Cr 1.28, TSH normal.  CT chest shows no PE, groundglass appearance of lungs without lobular consolidation, suggestive of infectious process viral or atypical pathogen. Small bilateral pleural effusions.  EKG sinus tachycardia.  Echocardiogram EF severely reduced 19%, LV severely dilated, LVDF c/w grade III fixed restrictive pattern, severe MR     Patient received 1 dose of lasix 80mg IV and 10mg IV labetalol for blood pressure.        Assessment:  :     Acute HFrEF due to systolic dysfunction of unclear etiology  Cardiomyopathy  Severe mitral regurgitation  Sinus tachycardia  Prediabetes  Obesity with BMI of 30  Renal insufficiency        Recommendations / Plan:         Patient presented with 1 month duration of shortness of breath and dyspnea on exertion, not relieved by conservative care including antibiotics, presented to ER.  Workup revealed serial troponins of 23 and 32, BNP elevated at 800 and creatinine 1.28.  CT chest showed groundglass appearance of lung and small bilateral pleural effusions.  EKG reviewed by me from 6/3/2025 reveals sinus tachycardia with rate of 131 beats per.  Echocardiogram revealed severe LV dysfunction EF of 19% with restrictive pattern and severe MR.  Patient is presenting with LV dysfunction and CHF and MR of unclear etiology.  Diuresis with intravenous Lasix as tolerated  Monitor function and urine  "output and electrolytes.  Beta-blockers  Patient underwent cardiac cath 6/4/2025 which revealed no obstructive CAD, severe LV enlargement with severe global left lower hypokinesis, LVEF of 10 to 15% with elevated LVEDP of 24.  Will start patient on metoprolol and Entresto.  Will monitor renal function  Will add Aldactone before discharge.  Since patient has severe LV dysfunction would benefit from Life[facility]est and follow-up and repeat LV function as outpatient        Copied text in this portion of the note has been reviewed and is accurate as of 6/4/2025    Past Medical History:  History reviewed. No pertinent past medical history.  Past Surgical History:  History reviewed. No pertinent surgical history.     Social History:   Social History     Tobacco Use    Smoking status: Never    Smokeless tobacco: Never   Substance Use Topics    Alcohol use: Not Currently      Family History:  History reviewed. No pertinent family history.       Allergies:  No Known Allergies  Scheduled Meds:  [Transfer Hold] furosemide, 40 mg, Q12H  metoprolol succinate XL, 12.5 mg, Q12H  sacubitril-valsartan, 1 tablet, Q12H  [Transfer Hold] sodium chloride, 10 mL, Q12H          Review of Systems:   ROS  Review of Systems   Constitution: Negative for chills and fever.   Cardiovascular: Negative for chest pain and palpitations.   Respiratory: Negative for cough and hemoptysis.    Gastrointestinal: Negative for nausea.        Constitutional:  Temp:  [97.3 °F (36.3 °C)-98.5 °F (36.9 °C)] 97.9 °F (36.6 °C)  Heart Rate:  [] 111  Resp:  [15-34] 24  BP: (101-151)/() 136/89    Physical Exam   /89   Pulse 111   Temp 97.9 °F (36.6 °C) (Oral)   Resp 24   Ht 180.3 cm (71\")   Wt 112 kg (246 lb 11.1 oz)   SpO2 91%   BMI 34.41 kg/m²   General:  Appears in no acute distress  Eyes: Sclera is anicteric,  conjunctiva is clear   HEENT:  No JVD. Thyroid not visibly enlarged. No mucosal pallor or cyanosis  Respiratory: Respirations regular and " unlabored at rest.  Clear to auscultation  Cardiovascular: S1,S2 Regular rate and rhythm. No murmur, rub or gallop auscultated.  . No pretibial pitting edema  Gastrointestinal: Abdomen nondistended.  Musculoskeletal:  No abnormal movements  Extremities: No digital clubbing or cyanosis  Skin: Color pink.   Neuro: Alert and awake.    INTAKE AND OUTPUT:    Intake/Output Summary (Last 24 hours) at 6/4/2025 1309  Last data filed at 6/3/2025 2200  Gross per 24 hour   Intake --   Output 3800 ml   Net -3800 ml       Cardiographics  Telemetry: Reviewed and interpreted by me reveals sinus tachycardia    ECG:   ECG 12 Lead Dyspnea   Final Result   HEART ZYFG=017  bpm   RR Vseksmyo=000  ms   AL Taahibyf=804  ms   P Horizontal Axis=0  deg   P Front Axis=56  deg   QRSD Interval=86  ms   QT Jdmxfdqp=533  ms   LBoA=782  ms   QRS Axis=-12  deg   T Wave Axis=44  deg   - BORDERLINE ECG -   Sinus tachycardia   Consider  left ventricular hypertrophy   When compared with ECG of 05-Mar-2019 10:41:17,   Significant change in rhythm   Significant axis, voltage or hypertrophy change   Electronically Signed By: Cheikh Veras (Zeke) 2025-06-04 07:29:13   Date and Time of Study:2025-06-03 07:03:46      Telemetry Scan   Final Result      Telemetry Scan   Final Result      Telemetry Scan   Final Result      Telemetry Scan   Final Result      Telemetry Scan   Final Result        I have personally reviewed EKG    Echocardiogram: Results for orders placed during the hospital encounter of 06/03/25    Adult Transthoracic Echo Complete W/ Cont if Necessary Per Protocol    Interpretation Summary    Left ventricular systolic function is severely decreased. Calculated left ventricular EF = 19.1%    The left ventricular cavity is moderate to severely dilated.    Left ventricular diastolic function is consistent with (grade III w/high LAP) fixed restrictive pattern.  Average GLS -2.8%.    Moderately reduced right ventricular systolic function noted.    The  "right ventricular cavity is dilated.    Left atrial volume is moderately increased.    Abnormal mitral valve structure consistent with dilated annulus.    Severe mitral valve regurgitation is present.    Estimated right ventricular systolic pressure from tricuspid regurgitation is mildly elevated (35-45 mmHg).      STRESS TEST      HEART CATHETERIZATION  No results found for this or any previous visit.      Lab Review   I have reviewed the labs  Results from last 7 days   Lab Units 25  0832 25  0721   HSTROP T ng/L 32* 23*     Results from last 7 days   Lab Units 25  0721   MAGNESIUM mg/dL 2.0     Results from last 7 days   Lab Units 25  0400   SODIUM mmol/L 138   POTASSIUM mmol/L 3.9   BUN mg/dL 11.5   CREATININE mg/dL 1.26   CALCIUM mg/dL 9.1         Results from last 7 days   Lab Units 25  0400 25  0721   WBC 10*3/mm3 7.06 9.24   HEMOGLOBIN g/dL 16.3 16.8   HEMATOCRIT % 48.6 49.8   PLATELETS 10*3/mm3 263 275     Results from last 7 days   Lab Units 25  0721   INR  1.07   APTT seconds 23.1       RADIOLOGY:  Imaging Results (Last 24 Hours)       ** No results found for the last 24 hours. **          )2025  Davey Oliveira MD      EMR WalkHub/Transcription:   \"Dictated utilizing Dragon dictation\".     Electronically signed by Davey Oliveira MD at 25 1311          Consult Notes (all)        Davey Oliveira MD at 25 1548        Consult Orders    1. Inpatient Cardiology Consult [[id number]] ordered by Julia Lutz MD at 25 0951                     Cardiology Consult Note    Patient Identification:  Name: Twan Santos  Age: 35 y.o.  Sex: male  :  1990  MRN: [id number]             Requesting Physician :  Dr. Julia Lutz    Reason for Consultation / Chief Complaint :   dyspnea    History of Present Illness:      Twan Santos is a 33 year old male with a past history of:    -prediabetes on metformin  -nonsmoker  -no prior cardiac " history    Who presented to the ED with a 1 month history of shortness of breath. Patient reports that initially he started with some dyspnea back in March 2025. He thought it was allergy related. He went to urgent care and was placed on a Z-chris and albuterol inhaler with some relief of symptoms. He still felt poorly and went to his PCP. Now patient reports he has had more dyspnea particularly with exertion. He feels his right side is worse than his left.     Workup in the ED shows serial troponin of 23 and 32, , Cr 1.28, TSH normal.  CT chest shows no PE, groundglass appearance of lungs without lobular consolidation, suggestive of infectious process viral or atypical pathogen. Small bilateral pleural effusions.  EKG sinus tachycardia.  Echocardiogram EF severely reduced 19%, LV severely dilated, LVDF c/w grade III fixed restrictive pattern, severe MR    Patient received 1 dose of lasix 80mg IV and 10mg IV labetalol for blood pressure.    Electronically signed by OWEN Dominguez, 06/03/25, 4:49 PM EDT.    Cardiology attending addendum :    I have personally performed a face-to-face diagnostic evaluation, physical exam and reviewed data on this patient.  I have reviewed documentation done by me and nurse practitioner  and corrected as needed.  And agree with the different components of documentation.Greater than 50% of the time spent in the care of this patient was provided by attending consultant/me.         Assessment:  :    Acute HFrEF due to systolic dysfunction of unclear etiology  Cardiomyopathy  Severe mitral regurgitation  Sinus tachycardia  Prediabetes  Obesity with BMI of 30  Renal insufficiency      Recommendations / Plan:        Patient presented with 1 month duration of shortness of breath and dyspnea on exertion, not relieved by conservative care including antibiotics, presented to ER.  Workup revealed serial troponins of 23 and 32, BNP elevated at 800 and creatinine 1.28.  CT chest showed  groundglass appearance of lung and small bilateral pleural effusions.  EKG reviewed by me from 6/3/2025 reveals sinus tachycardia with rate of 131 beats per.  Echocardiogram revealed severe LV dysfunction EF of 19% with restrictive pattern and severe MR.  Patient is presenting with LV dysfunction and CHF and MR of unclear etiology.  Diuresis with intravenous Lasix as tolerated  Monitor function and urine output and electrolytes.  Beta-blockers  Patient would benefit from cardiac care to rule out acute ischemia as a cause of cardiomyopathy.  Will benefit from guideline directed medical therapy with metoprolol succinate, Entresto, furosemide as tolerated  If ischemic cardiomyopathy is ruled out most probably patient has viral cardiomyopathy with chronic leg illnesses starting in February of patient developing LV dysfunction since that  Will follow and consider further evaluation and treatment      Cardiographics  ECG: EKG tracing was  personally reviewed/interpreted by me  ECG 12 Lead Dyspnea   Preliminary Result   HEART ETDI=153  bpm   RR Siutxzlj=844  ms   CO Aypgaewg=085  ms   P Horizontal Axis=0  deg   P Front Axis=56  deg   QRSD Interval=86  ms   QT Iorcihqb=235  ms   ARxR=315  ms   QRS Axis=-12  deg   T Wave Axis=44  deg   - BORDERLINE ECG -   Sinus tachycardia   Consider left ventricular hypertrophy   Date and Time of Study:2025-06-03 07:03:46      Telemetry Scan   Final Result      Telemetry Scan   Final Result          Telemetry: Sinus tachycardia      Assessment       Diagnosis Plan   1. Dyspnea, unspecified type        2. Ground glass opacity present on imaging of lung        3. Pleural effusion, bilateral        4. Acute kidney injury              Past Medical History:  History reviewed. No pertinent past medical history.  Past Surgical History:  History reviewed. No pertinent surgical history.   Allergies:  No Known Allergies  Home Meds:  (Not in a hospital admission)    Current Meds:     Current  Facility-Administered Medications:     acetaminophen (TYLENOL) tablet 650 mg, 650 mg, Oral, Q4H PRN **OR** acetaminophen (TYLENOL) 160 MG/5ML oral solution 650 mg, 650 mg, Oral, Q4H PRN **OR** acetaminophen (TYLENOL) suppository 650 mg, 650 mg, Rectal, Q4H PRN, Cristiana Aguero, APRN    sennosides-docusate (PERICOLACE) 8.6-50 MG per tablet 2 tablet, 2 tablet, Oral, BID PRN **AND** polyethylene glycol (MIRALAX) packet 17 g, 17 g, Oral, Daily PRN **AND** bisacodyl (DULCOLAX) EC tablet 5 mg, 5 mg, Oral, Daily PRN **AND** bisacodyl (DULCOLAX) suppository 10 mg, 10 mg, Rectal, Daily PRN, Cristiana Aguero, APRN    Calcium Replacement - Follow Nurse / BPA Driven Protocol, , Not Applicable, PRN, Cristiana Aguero APRN    Magnesium Standard Dose Replacement - Follow Nurse / BPA Driven Protocol, , Not Applicable, PRN, Cristiana Aguero, APRN    nitroglycerin (NITROSTAT) SL tablet 0.4 mg, 0.4 mg, Sublingual, Q5 Min PRN, Cristiana Aguero APRN    ondansetron ODT (ZOFRAN-ODT) disintegrating tablet 4 mg, 4 mg, Oral, Q6H PRN **OR** ondansetron (ZOFRAN) injection 4 mg, 4 mg, Intravenous, Q6H PRN, Cristiana Aguero APROSEAS    Phosphorus Replacement - Follow Nurse / BPA Driven Protocol, , Not Applicable, PRN, Cristiana Aguero, APRN    Potassium Replacement - Follow Nurse / BPA Driven Protocol, , Not Applicable, PRN, Cristiana Aguero APRN    [COMPLETED] Insert Peripheral IV, , , Once **AND** sodium chloride 0.9 % flush 10 mL, 10 mL, Intravenous, PRN, Juanito Batistaa, APRN    sodium chloride 0.9 % flush 10 mL, 10 mL, Intravenous, Q12H, Cristiana Aguero APRN, 10 mL at 06/03/25 1338    sodium chloride 0.9 % flush 10 mL, 10 mL, Intravenous, PRN, Cristiana Aguero M, APRN    sodium chloride 0.9 % infusion 40 mL, 40 mL, Intravenous, PRN, Cristiana Aguero, APRN    Current Outpatient Medications:     albuterol sulfate  (90 Base) MCG/ACT inhaler, Inhale 1 puff Every 4 (Four) to 6 (Six) Hours As Needed for Wheezing or Shortness of Air., Disp: , Rfl:      "dextromethorphan polistirex ER (DELSYM) 30 MG/5ML Suspension Extended Release oral suspension, Take 10 mL by mouth 2 (Two) Times a Day As Needed (cough)., Disp: , Rfl:     glipizide (GLUCOTROL XL) 5 MG ER tablet, Take 1 tablet by mouth Daily., Disp: , Rfl:     metFORMIN (GLUCOPHAGE) 500 MG tablet, Take 2 tablets by mouth 2 (Two) Times a Day With Meals., Disp: , Rfl:     oxyCODONE (ROXICODONE) 10 MG tablet, Take 0.5-1 tablets by mouth 3 (Three) Times a Day As Needed for Moderate Pain or Severe Pain., Disp: , Rfl:     Testosterone Cypionate 200 MG/ML solution, Inject 1.5 mL into the appropriate muscle as directed by prescriber Every 7 (Seven) Days., Disp: , Rfl:   Social History:   Social History     Tobacco Use    Smoking status: Never    Smokeless tobacco: Never   Substance Use Topics    Alcohol use: Not Currently      Family History:  History reviewed. No pertinent family history.     Review of Systems : Review of Systems   Constitutional: Positive for malaise/fatigue. Negative for fever.   Cardiovascular:  Positive for dyspnea on exertion and orthopnea. Negative for leg swelling and palpitations.   Respiratory:  Positive for shortness of breath and sleep disturbances due to breathing.    Neurological:  Negative for dizziness and weakness.   All other systems reviewed and are negative.           Constitutional:  Temp:  [97.3 °F (36.3 °C)-97.6 °F (36.4 °C)] 97.6 °F (36.4 °C)  Heart Rate:  [] 108  Resp:  [15-34] 21  BP: (101-155)/() 116/87    Physical Exam   /87 (BP Location: Left arm, Patient Position: Sitting)   Pulse 108   Temp 97.6 °F (36.4 °C) (Oral)   Resp 21   Ht 180.3 cm (71\")   Wt 113 kg (248 lb 14.4 oz)   SpO2 95%   BMI 34.71 kg/m²   Physical Exam  General:  Appears in no acute distress  Eyes: Sclerae are anicteric,  conjunctivae are clear   HEENT:  No JVD. Thyroid not visibly enlarged. No mucosal pallor or cyanosis  Respiratory: Respirations regular and unlabored at rest.  " Diminished breath sounds R>L, No crackles, rubs or wheezes auscultated  Cardiovascular: S1,S2 Regular rhythm, tachycardia. No murmur, rub or gallop auscultated. No pretibial pitting edema  Gastrointestinal: Abdomen nondistended.  Musculoskeletal:  No abnormal movements  Extremities: No digital clubbing or cyanosis  Skin: Color pink. Skin warm and dry to touch.   Neuro: Alert and awake, no lateralizing deficits appreciated        Echocardiogram:   Results for orders placed during the hospital encounter of 06/03/25    Adult Transthoracic Echo Complete W/ Cont if Necessary Per Protocol    Interpretation Summary    Left ventricular systolic function is severely decreased. Calculated left ventricular EF = 19.1%    The left ventricular cavity is moderate to severely dilated.    Left ventricular diastolic function is consistent with (grade III w/high LAP) fixed restrictive pattern.  Average GLS -2.8%.    Moderately reduced right ventricular systolic function noted.    The right ventricular cavity is dilated.    Left atrial volume is moderately increased.    Abnormal mitral valve structure consistent with dilated annulus.    Severe mitral valve regurgitation is present.    Estimated right ventricular systolic pressure from tricuspid regurgitation is mildly elevated (35-45 mmHg).      STRESS TEST        Cardiolite (Tc-99m sestamibi) stress test    HEART CATHETERIZATION  No results found for this or any previous visit.        Imaging  Chest X-ray:   Imaging Results (Last 24 Hours)       Procedure Component Value Units Date/Time    US Renal Bilateral [[id number]] Collected: 06/03/25 1027     Updated: 06/03/25 1031    Narrative:      US RENAL BILATERAL    Date of Exam: 6/3/2025 10:02 AM EDT    Indication: DAYAMI.    Comparison: No comparisons available.    Technique: Grayscale and color Doppler ultrasound evaluation of the kidneys and urinary bladder was performed.    FINDINGS:      The right kidney measures 10.3 cm in length and  demonstrates normal cortical echogenicity.    The left kidney measures 11.3 cm in length and demonstrates normal cortical echogenicity.    No hydronephrosis or renal calculi identified.    The urinary bladder is partially distended with urine and appears unremarkable.        Impression:      1.No hydronephrosis or renal calculi identified.      Electronically Signed: Richy Leung MD    6/3/2025 10:29 AM EDT    Workstation ID: [id number]    CT Angiogram Chest Pulmonary Embolism [[id number]] Collected: 06/03/25 0751     Updated: 06/03/25 0757    Narrative:      CT ANGIOGRAM CHEST PULMONARY EMBOLISM    Date of Exam: 6/3/2025 7:25 AM EDT    Indication: dyspnea/tachycardia/hypoxia.    Comparison: None available.    Technique: Axial CT images were obtained of the chest after the uneventful intravenous administration of iodinated contrast utilizing pulmonary embolism protocol.  In addition, a 3-D volume rendered image was created for interpretation.  Sagittal and   coronal reconstructions were performed.  Automated exposure control and iterative reconstruction methods were used.      Findings:  PULMONARY VASCULATURE: Pulmonary arteries are widely patent without evidence of embolus.  Main pulmonary artery is normal in size. No evidence of right heart strain.    MEDIASTINUM:Unremarkable. Aortic and heart size are normal. No aortic dissection identified. No mass nor pericardial effusion.  CORONARY ARTERIES: No calcified atherosclerotic disease.  LUNGS: Widespread groundglass attenuation of the lungs without lobar consolidation. Few scattered areas of more dense airspace disease most pronounced in the lingula. Findings are most suggestive of infectious process in particular viral or atypical   pathogen. Correlate with history and symptoms. Noninfectious pulmonary edema is a consideration as well. Correlate with symptoms.   PLEURAL SPACE: There are small bilateral pleural effusions.  LYMPH NODES: There are no pathologically  enlarged lymph nodes.    UPPER ABDOMEN: There is a benign 2.4 cm left adrenal adenoma.    OSSEOUS STRUCTURES: Appropriate for age with no acute process identified.      Impression:      Impression:  1.No evidence of pulmonary embolus.  2.Widespread groundglass attenuation of the lungs without lobar consolidation. Few scattered areas of more dense airspace disease most pronounced in the lingula. Findings are most suggestive of infectious process in particular viral or atypical pathogen.   Noninfectious pulmonary edema is a consideration as well. Correlate with symptoms.  3.Small bilateral pleural effusions.  4.Benign 2.4 cm left adrenal adenoma.        Electronically Signed: Kyle Gongora MD    6/3/2025 7:55 AM EDT    Workstation ID: [id number]            Lab Review: I have reviewed the labs  Results from last 7 days   Lab Units 06/03/25  0832 06/03/25  0721   HSTROP T ng/L 32* 23*     Results from last 7 days   Lab Units 06/03/25  0721   MAGNESIUM mg/dL 2.0     Results from last 7 days   Lab Units 06/03/25  0721   SODIUM mmol/L 135*   POTASSIUM mmol/L 4.6   BUN mg/dL 9.1   CREATININE mg/dL 1.28*   CALCIUM mg/dL 9.2         Results from last 7 days   Lab Units 06/03/25  0721   PROBNP pg/mL 807.0*     Results from last 7 days   Lab Units 06/03/25  0721   WBC 10*3/mm3 9.24   HEMOGLOBIN g/dL 16.8   HEMATOCRIT % 49.8   PLATELETS 10*3/mm3 275     Results from last 7 days   Lab Units 06/03/25  0721   INR  1.07   APTT seconds 23.1       Davey Oliveira MD  6/3/2025, 18:19 EDT      EMR Dragon/Transcription:   Dictated utilizing Dragon dictation    Electronically signed by Davey Olvieira MD at 06/03/25 2006

## 2025-06-04 NOTE — CASE MANAGEMENT/SOCIAL WORK
Continued Stay Note   Joey     Patient Name: Twan Santos  MRN: [id number]  Today's Date: 6/4/2025    Admit Date: 6/3/2025    Plan: Return home with family. [facility] pending.   Discharge Plan       Row Name 06/04/25 1545       Plan    Plan Return home with family. [facility] pending.    Plan Comments CM noted [facility] recommendations per Cardiology note. Confirmed order was placed. CM contacted Marcia Dickey to notify of order.             Megan Naegele, RN     Office Phone: [phone]  Office Cell: [phone]

## 2025-06-04 NOTE — CONSULTS
"Heart Failure Program  Nurse Navigator  Discharge Planning    Patient Name:Twan Santos  :1990  Cardiologist:Roxanne  Current Admission Date: 6/3/2025   Previous Admission:    Admission frequency: 1 admissions in 6 months    Heart Failure history per record:    Symptoms on admission:c/o SOA past month with orthopnea, non productive cough and abd bloating. Pt states he was treated with antibiotic from PCP initially.       Admission Weight:  Flowsheet Rows      Flowsheet Row First Filed Value   Admission Height 180.3 cm (71\") Documented at 2025 0652   Admission Weight 113 kg (248 lb 14.4 oz) Documented at 2025 0652              Current Home Medications:  Prior to Admission medications    Medication Sig Start Date End Date Taking? Authorizing Provider   albuterol sulfate  (90 Base) MCG/ACT inhaler Inhale 1 puff Every 4 (Four) to 6 (Six) Hours As Needed for Wheezing or Shortness of Air.   Yes Izabel Rios MD   dextromethorphan polistirex ER (DELSYM) 30 MG/5ML Suspension Extended Release oral suspension Take 10 mL by mouth 2 (Two) Times a Day As Needed (cough).   Yes Izabel Rios MD   glipizide (GLUCOTROL XL) 5 MG ER tablet Take 1 tablet by mouth Daily.   Yes Izabel Rios MD   metFORMIN (GLUCOPHAGE) 500 MG tablet Take 2 tablets by mouth 2 (Two) Times a Day With Meals.   Yes Izabel Rios MD   oxyCODONE (ROXICODONE) 10 MG tablet Take 0.5-1 tablets by mouth 3 (Three) Times a Day As Needed for Moderate Pain or Severe Pain.   Yes Izabel Rios MD   Testosterone Cypionate 200 MG/ML solution Inject 1.5 mL into the appropriate muscle as directed by prescriber Every 7 (Seven) Days.   Yes Izabel Rios MD       Social history:   Pt lives home with spouse and children. No transportation issues identified. Pt reports does drink lots of fluids daily including lots of coffee/caffeine. Pt does not have insurance, high cost medications will be " difficult.    Smoking status:     Diagnostics Testing:  proBNP level: 807    Echocardiogram:Results for orders placed during the hospital encounter of 06/03/25    Adult Transthoracic Echo Complete W/ Cont if Necessary Per Protocol    Interpretation Summary    Left ventricular systolic function is severely decreased. Calculated left ventricular EF = 19.1%    The left ventricular cavity is moderate to severely dilated.    Left ventricular diastolic function is consistent with (grade III w/high LAP) fixed restrictive pattern.  Average GLS -2.8%.    Moderately reduced right ventricular systolic function noted.    The right ventricular cavity is dilated.    Left atrial volume is moderately increased.    Abnormal mitral valve structure consistent with dilated annulus.    Severe mitral valve regurgitation is present.    Estimated right ventricular systolic pressure from tricuspid regurgitation is mildly elevated (35-45 mmHg).        Patient Assessment:   Pt sitting up in bed, resp even and unlabored.     Current O2:     Home O2:       Education provided to patient:  yes- Heart Failure disease education  yes -Symptom identification/management  yes -Daily Weights  yes- Diet education  yes- Fluid restriction (if ordered)  yes- Activity education  yes- Medication education   - Smoking cessation  yes- Follow-up Appointments  yes-Provided information on how to access [facility] My HF Guide/Heart Failure Interactive workbook    Acceptance of learning: acceptance, cooperative    Heart Failure education interactive teaching session time: 45 minutes    GWTG: EF 19%    Identified needs/barriers:   New dx, Volume status, GDMT -entresto, metoprolol. Pending life vest, per chart no insurance, needs 7 day apt and cardiology apt    Intervention:   Education, HF clinic apt set

## 2025-06-04 NOTE — CASE MANAGEMENT/SOCIAL WORK
Continued Stay Note  MARIELLA Cook     Patient Name: Twan Santos  MRN: [id number]  Today's Date: 6/3/2025    Admit Date: 6/3/2025    Plan: From Home with Ramesh   Discharge Plan       Row Name 06/03/25 2010       Plan    Plan Comments Per Response from [facility]. Pt is not eligble for [facility].                 Inga Moctezuma RN    Phone [phone]  Fax [phone]

## 2025-06-05 LAB
ANION GAP SERPL CALCULATED.3IONS-SCNC: 16.1 MMOL/L (ref 5–15)
BASOPHILS # BLD AUTO: 0.03 10*3/MM3 (ref 0–0.2)
BASOPHILS NFR BLD AUTO: 0.4 % (ref 0–1.5)
BUN SERPL-MCNC: 15.5 MG/DL (ref 6–20)
BUN/CREAT SERPL: 13.4 (ref 7–25)
CALCIUM SPEC-SCNC: 9.2 MG/DL (ref 8.6–10.5)
CHLORIDE SERPL-SCNC: 100 MMOL/L (ref 98–107)
CO2 SERPL-SCNC: 21.9 MMOL/L (ref 22–29)
CREAT SERPL-MCNC: 1.16 MG/DL (ref 0.76–1.27)
DEPRECATED RDW RBC AUTO: 38.8 FL (ref 37–54)
EGFRCR SERPLBLD CKD-EPI 2021: 83.7 ML/MIN/1.73
EOSINOPHIL # BLD AUTO: 0.1 10*3/MM3 (ref 0–0.4)
EOSINOPHIL NFR BLD AUTO: 1.2 % (ref 0.3–6.2)
ERYTHROCYTE [DISTWIDTH] IN BLOOD BY AUTOMATED COUNT: 12.5 % (ref 12.3–15.4)
GLUCOSE SERPL-MCNC: 169 MG/DL (ref 65–99)
HCT VFR BLD AUTO: 51.5 % (ref 37.5–51)
HGB BLD-MCNC: 17.6 G/DL (ref 13–17.7)
IMM GRANULOCYTES # BLD AUTO: 0.02 10*3/MM3 (ref 0–0.05)
IMM GRANULOCYTES NFR BLD AUTO: 0.2 % (ref 0–0.5)
LYMPHOCYTES # BLD AUTO: 1.67 10*3/MM3 (ref 0.7–3.1)
LYMPHOCYTES NFR BLD AUTO: 19.6 % (ref 19.6–45.3)
MCH RBC QN AUTO: 29.3 PG (ref 26.6–33)
MCHC RBC AUTO-ENTMCNC: 34.2 G/DL (ref 31.5–35.7)
MCV RBC AUTO: 85.7 FL (ref 79–97)
MONOCYTES # BLD AUTO: 0.59 10*3/MM3 (ref 0.1–0.9)
MONOCYTES NFR BLD AUTO: 6.9 % (ref 5–12)
NEUTROPHILS NFR BLD AUTO: 6.13 10*3/MM3 (ref 1.7–7)
NEUTROPHILS NFR BLD AUTO: 71.7 % (ref 42.7–76)
NRBC BLD AUTO-RTO: 0 /100 WBC (ref 0–0.2)
PLATELET # BLD AUTO: 274 10*3/MM3 (ref 140–450)
PMV BLD AUTO: 10.9 FL (ref 6–12)
POTASSIUM SERPL-SCNC: 3.6 MMOL/L (ref 3.5–5.2)
POTASSIUM SERPL-SCNC: 4.9 MMOL/L (ref 3.5–5.2)
QT INTERVAL: 375 MS
QTC INTERVAL: 487 MS
RBC # BLD AUTO: 6.01 10*6/MM3 (ref 4.14–5.8)
SODIUM SERPL-SCNC: 138 MMOL/L (ref 136–145)
WBC NRBC COR # BLD AUTO: 8.54 10*3/MM3 (ref 3.4–10.8)

## 2025-06-05 PROCEDURE — 93010 ELECTROCARDIOGRAM REPORT: CPT | Performed by: INTERNAL MEDICINE

## 2025-06-05 PROCEDURE — 85025 COMPLETE CBC W/AUTO DIFF WBC: CPT | Performed by: INTERNAL MEDICINE

## 2025-06-05 PROCEDURE — 99232 SBSQ HOSP IP/OBS MODERATE 35: CPT | Performed by: INTERNAL MEDICINE

## 2025-06-05 PROCEDURE — 84132 ASSAY OF SERUM POTASSIUM: CPT

## 2025-06-05 PROCEDURE — 80048 BASIC METABOLIC PNL TOTAL CA: CPT | Performed by: INTERNAL MEDICINE

## 2025-06-05 PROCEDURE — 93005 ELECTROCARDIOGRAM TRACING: CPT | Performed by: INTERNAL MEDICINE

## 2025-06-05 PROCEDURE — 25010000002 FUROSEMIDE PER 20 MG: Performed by: INTERNAL MEDICINE

## 2025-06-05 RX ORDER — METOPROLOL SUCCINATE 25 MG/1
25 TABLET, EXTENDED RELEASE ORAL EVERY 12 HOURS SCHEDULED
Status: DISCONTINUED | OUTPATIENT
Start: 2025-06-05 | End: 2025-06-06 | Stop reason: HOSPADM

## 2025-06-05 RX ORDER — SPIRONOLACTONE 25 MG/1
25 TABLET ORAL DAILY
Status: DISCONTINUED | OUTPATIENT
Start: 2025-06-05 | End: 2025-06-06 | Stop reason: HOSPADM

## 2025-06-05 RX ORDER — POTASSIUM CHLORIDE 1500 MG/1
40 TABLET, EXTENDED RELEASE ORAL EVERY 4 HOURS
Status: COMPLETED | OUTPATIENT
Start: 2025-06-05 | End: 2025-06-05

## 2025-06-05 RX ADMIN — SPIRONOLACTONE 25 MG: 25 TABLET ORAL at 15:49

## 2025-06-05 RX ADMIN — SACUBITRIL AND VALSARTAN 1 TABLET: 24; 26 TABLET, FILM COATED ORAL at 07:43

## 2025-06-05 RX ADMIN — Medication 10 ML: at 07:43

## 2025-06-05 RX ADMIN — POTASSIUM CHLORIDE 40 MEQ: 1500 TABLET, EXTENDED RELEASE ORAL at 03:53

## 2025-06-05 RX ADMIN — POTASSIUM CHLORIDE 40 MEQ: 1500 TABLET, EXTENDED RELEASE ORAL at 07:43

## 2025-06-05 RX ADMIN — METOPROLOL SUCCINATE 12.5 MG: 25 TABLET, EXTENDED RELEASE ORAL at 07:43

## 2025-06-05 RX ADMIN — Medication 10 ML: at 20:23

## 2025-06-05 RX ADMIN — FUROSEMIDE 40 MG: 10 INJECTION, SOLUTION INTRAMUSCULAR; INTRAVENOUS at 07:43

## 2025-06-05 RX ADMIN — METOPROLOL SUCCINATE 25 MG: 25 TABLET, EXTENDED RELEASE ORAL at 20:23

## 2025-06-05 RX ADMIN — SACUBITRIL AND VALSARTAN 1 TABLET: 24; 26 TABLET, FILM COATED ORAL at 20:23

## 2025-06-05 NOTE — CASE MANAGEMENT/SOCIAL WORK
Continued Stay Note  MARIELLA Cook     Patient Name: Twan Santos  MRN: [id number]  Today's Date: 6/5/2025    Admit Date: 6/3/2025    Plan: Return home with family. New [employer] pending.   Discharge Plan       Row Name 06/05/25 1532       Plan    Patient/Family in Agreement with Plan yes    Plan Comments Received patient assistance application form from Marcia Dickey. Provided to patient at bedside to complete as well as [employer] assistance program form for Entresto. Pt completed forms and OWEN Yanez completed section for Entresto. [employer] forms emailed to [employer] with pt's last 2 pay stubs. [employer] form faxed via ad-hoc communication. Awaiting approval for [employer].             Megan Naegele, RN     Office Phone: [phone]  Office Cell: [phone]

## 2025-06-05 NOTE — PROGRESS NOTES
Cardiology Progress Note    Patient Identification:  Name: Twan Santos  Age: 35 y.o.  Sex: male  :  1990  MRN: [id number]                 Follow Up / Chief Complaint: CHF, cardiomyopathy  Chief Complaint   Patient presents with    Shortness of Breath       Interval History: Patient presented with complaint of shortness of breath, was diagnosed with acute congestive heart failure due to systolic dysfunction.  Patient underwent cardiac cath on 2025 that showed nonobstructive disease with severe LV dysfunction with a EF less than 20 and elevated LVEDP with 2-3+ MR       NP note: Patient seen with Dr. Oliveira.  He denies any chest pain or shortness of breath.  Cardiac cath as above without ischemia.  Patient assistance form for Entresto filled out.  Patient has appointment next week with HF clinic.  Will follow up in one month and schedule repeat echo for 3 month.  Awaiting life vest approval.      Increase metoprolol to 25mg BID  stop IV lasix     Continue metoprolol and spironolactone as tolerated.     Electronically signed by OWEN Sandoval, 25, 1:24 PM EDT.    Cardiology attending addendum :    I have personally performed a face-to-face diagnostic evaluation, physical exam and reviewed data on this patient.  I have reviewed documentation done by me and nurse practitioner  and corrected as needed.  And agree with the different components of documentation.Greater than 50% of the time spent in the care of this patient was provided by attending consultant/me.        Subjective: Patient seen and examined.  Chart reviewed.  Labs reviewed.  Discussed with RN taking care of patient.  Patient denies any chest pain or shortness of breath today      Objective:  HS troponin 23-->32  2025: BUN/creatinine 11.51.26, glucose 165, normal CBC  2025: potassium 3.6 glucose 169     History of present illness:      Twan Santos is a 35 year old male with a past history of:     -prediabetes on  metformin  -nonsmoker  -no prior cardiac history     Who presented to the ED with a 1 month history of shortness of breath. Patient reports that initially he started with some dyspnea back in March 2025. He thought it was allergy related. He went to urgent care and was placed on a Z-chris and albuterol inhaler with some relief of symptoms. He still felt poorly and went to his PCP. Now patient reports he has had more dyspnea particularly with exertion. He feels his right side is worse than his left.      Workup in the ED shows serial troponin of 23 and 32, , Cr 1.28, TSH normal.  CT chest shows no PE, groundglass appearance of lungs without lobular consolidation, suggestive of infectious process viral or atypical pathogen. Small bilateral pleural effusions.  EKG sinus tachycardia.  Echocardiogram EF severely reduced 19%, LV severely dilated, LVDF c/w grade III fixed restrictive pattern, severe MR     Patient received 1 dose of lasix 80mg IV and 10mg IV labetalol for blood pressure.        Assessment:  :     Acute HFrEF due to systolic dysfunction of unclear etiology  Cardiomyopathy  Severe mitral regurgitation  Sinus tachycardia  Prediabetes  Obesity with BMI of 30  Renal insufficiency        Recommendations / Plan:         Patient presented with 1 month duration of shortness of breath and dyspnea on exertion, not relieved by conservative care including antibiotics, presented to ER.  Workup revealed serial troponins of 23 and 32, BNP elevated at 800 and creatinine 1.28.  CT chest showed groundglass appearance of lung and small bilateral pleural effusions.  EKG reviewed by me from 6/3/2025 reveals sinus tachycardia with rate of 131 beats per.  Echocardiogram revealed severe LV dysfunction EF of 19% with restrictive pattern and severe MR.  Patient underwent cardiac cath 6/4/2025 which revealed no obstructive CAD, severe LV enlargement with severe global left lower hypokinesis, LVEF of 10 to 15% with elevated LVEDP  "of 24.  Will continue medical management with metoprolol, Entresto, Aldactone as tolerated  Patient has severe LV dysfunction discussed about [employer]est.  Patient says he does not have insurance and cannot afford LifeVest.  Filled out forms for Entresto to get free for 1 month.  Will follow-up and recheck LV function and consider ICD as outpatient.  Will follow-up as needed.  Please call me back if I can be of any further assistance.      Copied text in this portion of the note has been reviewed and is accurate as of 6/5/2025    Past Medical History:  History reviewed. No pertinent past medical history.  Past Surgical History:  Past Surgical History:   Procedure Laterality Date    CARDIAC CATHETERIZATION N/A 6/4/2025    Procedure: Left Heart Cath;  Surgeon: Davey Oliveira MD;  Location: [facility] CATH INVASIVE LOCATION;  Service: Cardiovascular;  Laterality: N/A;        Social History:   Social History     Tobacco Use    Smoking status: Never    Smokeless tobacco: Never   Substance Use Topics    Alcohol use: Not Currently      Family History:  History reviewed. No pertinent family history.       Allergies:  No Known Allergies  Scheduled Meds:  furosemide, 40 mg, Q12H  metoprolol succinate XL, 12.5 mg, Q12H  sacubitril-valsartan, 1 tablet, Q12H  sodium chloride, 10 mL, Q12H          Review of Systems:   ROS  Review of Systems   Constitution: Negative for chills and fever.   Cardiovascular: Negative for chest pain and palpitations.   Respiratory: Negative for cough and hemoptysis.    Gastrointestinal: Negative for nausea.        Constitutional:  Temp:  [97.5 °F (36.4 °C)-98.4 °F (36.9 °C)] 98.3 °F (36.8 °C)  Heart Rate:  [100-115] 104  Resp:  [12-25] 12  BP: (104-151)/() 110/72    Physical Exam   /72 (BP Location: Left arm, Patient Position: Lying)   Pulse 104   Temp 98.3 °F (36.8 °C) (Oral)   Resp 12   Ht 180.3 cm (71\")   Wt 112 kg (246 lb 11.1 oz)   SpO2 93%   BMI 34.41 kg/m²   General:  " Appears in no acute distress  Eyes: Sclera is anicteric,  conjunctiva is clear   HEENT:  No JVD. Thyroid not visibly enlarged. No mucosal pallor or cyanosis  Respiratory: Respirations regular and unlabored at rest.  Clear to auscultation  Cardiovascular: S1,S2 Regular rate and rhythm. No murmur, rub or gallop auscultated.  . No pretibial pitting edema  Gastrointestinal: Abdomen nondistended.  Musculoskeletal:  No abnormal movements  Extremities: No digital clubbing or cyanosis  Skin: Color pink.   Neuro: Alert and awake.    INTAKE AND OUTPUT:    Intake/Output Summary (Last 24 hours) at 6/5/2025 0848  Last data filed at 6/5/2025 0000  Gross per 24 hour   Intake 1957 ml   Output 1375.3 ml   Net 581.7 ml       Cardiographics  Telemetry: Reviewed and interpreted by me reveals sinus    ECG:   ECG 12 Lead Rhythm Change   Preliminary Result   HEART BGXO=600  bpm   RR Vyjnyjme=976  ms   WY Monxfpuw=338  ms   P Horizontal Axis=24  deg   P Front Axis=39  deg   QRSD Interval=94  ms   QT Amdcdvcz=152  ms   JNcN=214  ms   QRS Axis=-21  deg   T Wave Axis=39  deg   - BORDERLINE ECG -   Sinus tachycardia   Left ventricular hypertrophy   Date and Time of Study:2025-06-05 06:04:29      ECG 12 Lead Dyspnea   Final Result   HEART ILHZ=958  bpm   RR Rtenaqhl=791  ms   WY Hhxnbyae=176  ms   P Horizontal Axis=0  deg   P Front Axis=56  deg   QRSD Interval=86  ms   QT Xppedeel=302  ms   SVrH=090  ms   QRS Axis=-12  deg   T Wave Axis=44  deg   - BORDERLINE ECG -   Sinus tachycardia   Consider  left ventricular hypertrophy   When compared with ECG of 05-Mar-2019 10:41:17,   Significant change in rhythm   Significant axis, voltage or hypertrophy change   Electronically Signed By: Cheikh Veras (Zeke) 2025-06-04 07:29:13   Date and Time of Study:2025-06-03 07:03:46      Telemetry Scan   Final Result      Telemetry Scan   Final Result      Telemetry Scan   Final Result      Telemetry Scan   Final Result      Telemetry Scan   Final Result     "  Telemetry Scan   Final Result      Telemetry Scan   Final Result        I have personally reviewed EKG    Echocardiogram: Results for orders placed during the hospital encounter of 06/03/25    Adult Transthoracic Echo Complete W/ Cont if Necessary Per Protocol    Interpretation Summary    Left ventricular systolic function is severely decreased. Calculated left ventricular EF = 19.1%    The left ventricular cavity is moderate to severely dilated.    Left ventricular diastolic function is consistent with (grade III w/high LAP) fixed restrictive pattern.  Average GLS -2.8%.    Moderately reduced right ventricular systolic function noted.    The right ventricular cavity is dilated.    Left atrial volume is moderately increased.    Abnormal mitral valve structure consistent with dilated annulus.    Severe mitral valve regurgitation is present.    Estimated right ventricular systolic pressure from tricuspid regurgitation is mildly elevated (35-45 mmHg).      STRESS TEST      HEART CATHETERIZATION  No results found for this or any previous visit.      Lab Review   I have reviewed the labs  Results from last 7 days   Lab Units 06/03/25  0832 06/03/25  0721   HSTROP T ng/L 32* 23*     Results from last 7 days   Lab Units 06/03/25  0721   MAGNESIUM mg/dL 2.0     Results from last 7 days   Lab Units 06/05/25  0013   SODIUM mmol/L 138   POTASSIUM mmol/L 3.6   BUN mg/dL 15.5   CREATININE mg/dL 1.16   CALCIUM mg/dL 9.2         Results from last 7 days   Lab Units 06/05/25  0013 06/04/25  0400 06/03/25  0721   WBC 10*3/mm3 8.54 7.06 9.24   HEMOGLOBIN g/dL 17.6 16.3 16.8   HEMATOCRIT % 51.5* 48.6 49.8   PLATELETS 10*3/mm3 274 263 275     Results from last 7 days   Lab Units 06/03/25  0721   INR  1.07   APTT seconds 23.1       RADIOLOGY:  Imaging Results (Last 24 Hours)       ** No results found for the last 24 hours. **                  )6/5/2025  Davey Oliveira MD      EMR Dragon/Transcription:   \"Dictated utilizing " "Dragon dictation\".   "

## 2025-06-05 NOTE — PLAN OF CARE
Goal Outcome Evaluation:  Plan of Care Reviewed With: patient   Pt reporting no SoA on RA, no pain at this time     Progress: improving

## 2025-06-05 NOTE — PROGRESS NOTES
LOS: 1 day   Patient Care Team:  Logan Del Rio MD as PCP - General    Subjective     Patient denies any new complaints    Review of Systems   Constitutional: Negative.    HENT: Negative.     Respiratory: Negative.     Cardiovascular: Negative.    Gastrointestinal: Negative.    Genitourinary: Negative.    Musculoskeletal: Negative.    Neurological: Negative.    Psychiatric/Behavioral: Negative.             Objective     Vital Signs  Temp:  [97.5 °F (36.4 °C)-98.4 °F (36.9 °C)] 98.3 °F (36.8 °C)  Heart Rate:  [100-115] 104  Resp:  [12-25] 12  BP: (104-151)/() 110/72      Physical Exam  Vitals reviewed.   Constitutional:       Appearance: He is not ill-appearing.   HENT:      Head: Normocephalic and atraumatic.      Right Ear: External ear normal.      Left Ear: External ear normal.      Nose: Nose normal.      Mouth/Throat:      Mouth: Mucous membranes are moist.   Eyes:      General:         Right eye: No discharge.         Left eye: No discharge.   Cardiovascular:      Rate and Rhythm: Normal rate and regular rhythm.      Pulses: Normal pulses.      Heart sounds: Normal heart sounds.   Pulmonary:      Effort: Pulmonary effort is normal.      Breath sounds: Normal breath sounds.   Abdominal:      General: Bowel sounds are normal.      Palpations: Abdomen is soft.   Musculoskeletal:         General: Normal range of motion.      Cervical back: Normal range of motion.   Skin:     General: Skin is warm and dry.   Neurological:      Mental Status: He is alert and oriented to person, place, and time.   Psychiatric:         Behavior: Behavior normal.              Results Review:    Lab Results (last 24 hours)       Procedure Component Value Units Date/Time    Basic Metabolic Panel [[id number]]  (Abnormal) Collected: 06/05/25 0013    Specimen: Blood from Arm, Left Updated: 06/05/25 0044     Glucose 169 mg/dL      BUN 15.5 mg/dL      Creatinine 1.16 mg/dL      Sodium 138 mmol/L      Potassium 3.6 mmol/L       Chloride 100 mmol/L      CO2 21.9 mmol/L      Calcium 9.2 mg/dL      BUN/Creatinine Ratio 13.4     Anion Gap 16.1 mmol/L      eGFR 83.7 mL/min/1.73     Narrative:      GFR Categories in Chronic Kidney Disease (CKD)              GFR Category          GFR (mL/min/1.73)    Interpretation  G1                    90 or greater        Normal or high (1)  G2                    60-89                Mild decrease (1)  G3a                   45-59                Mild to moderate decrease  G3b                   30-44                Moderate to severe decrease  G4                    15-29                Severe decrease  G5                    14 or less           Kidney failure    (1)In the absence of evidence of kidney disease, neither GFR category G1 or G2 fulfill the criteria for CKD.    eGFR calculation 2021 CKD-EPI creatinine equation, which does not include race as a factor    CBC & Differential [[id number]]  (Abnormal) Collected: 06/05/25 0013    Specimen: Blood from Arm, Left Updated: 06/05/25 0022    Narrative:      The following orders were created for panel order CBC & Differential.  Procedure                               Abnormality         Status                     ---------                               -----------         ------                     CBC Auto Differential[[id number]]        Abnormal            Final result                 Please view results for these tests on the individual orders.    CBC Auto Differential [[id number]]  (Abnormal) Collected: 06/05/25 0013    Specimen: Blood from Arm, Left Updated: 06/05/25 0022     WBC 8.54 10*3/mm3      RBC 6.01 10*6/mm3      Hemoglobin 17.6 g/dL      Hematocrit 51.5 %      MCV 85.7 fL      MCH 29.3 pg      MCHC 34.2 g/dL      RDW 12.5 %      RDW-SD 38.8 fl      MPV 10.9 fL      Platelets 274 10*3/mm3      Neutrophil % 71.7 %      Lymphocyte % 19.6 %      Monocyte % 6.9 %      Eosinophil % 1.2 %      Basophil % 0.4 %      Immature Grans % 0.2 %      Neutrophils,  Absolute 6.13 10*3/mm3      Lymphocytes, Absolute 1.67 10*3/mm3      Monocytes, Absolute 0.59 10*3/mm3      Eosinophils, Absolute 0.10 10*3/mm3      Basophils, Absolute 0.03 10*3/mm3      Immature Grans, Absolute 0.02 10*3/mm3      nRBC 0.0 /100 WBC              Imaging Results (Last 24 Hours)       ** No results found for the last 24 hours. **                 I reviewed the patient's new clinical results.    Medication Review:   Scheduled Meds:furosemide, 40 mg, Intravenous, Q12H  metoprolol succinate XL, 12.5 mg, Oral, Q12H  sacubitril-valsartan, 1 tablet, Oral, Q12H  sodium chloride, 10 mL, Intravenous, Q12H      Continuous Infusions:   PRN Meds:.  acetaminophen **OR** acetaminophen **OR** acetaminophen    senna-docusate sodium **AND** polyethylene glycol **AND** bisacodyl **AND** bisacodyl    Calcium Replacement - Follow Nurse / BPA Driven Protocol    Magnesium Standard Dose Replacement - Follow Nurse / BPA Driven Protocol    nitroglycerin    ondansetron ODT **OR** ondansetron    Phosphorus Replacement - Follow Nurse / BPA Driven Protocol    Potassium Replacement - Follow Nurse / BPA Driven Protocol    [COMPLETED] Insert Peripheral IV **AND** sodium chloride    sodium chloride    sodium chloride     Interval History:    Assessment & Plan     Dyspnea  Sinus tachycardia  Elevated blood pressure reading  Acute kidney failure  Obesity (BMI 30-39.9)    Plan of care  -Echo noted with EF reduced 19%,  -cardiac findings with Nonobstructive CAD. LV with severe LV dysfunction. Elevated LVEDP,Mitral regurgitation     - BB, Entresto, and furosemide  -patient will need life vest and when delivered can discharge  -will need assistance to continue entresto  - Patient sleep study as outpatient     Diet: Healthy heart     CODE STATUS: Full code  Code status (Patient has no pulse and is not breathing):  Medical Interventions (Patient has pulse or is breathing):  Level of Support Discussed with : Patient     Admission Status:  inptatient     Expected length of Stay: 1 to 2 days  Plan for disposition:Home    OWEN Alicia  06/05/25  10:45 EDT

## 2025-06-05 NOTE — NURSING NOTE
Spoke with a [employer] rep who said they did not have any machines in house. They expect one to be delivered tomorrow between 11 am and 3 pm. As soon as it arrives, they will deliver here.    negative...

## 2025-06-05 NOTE — PLAN OF CARE
Goal Outcome Evaluation:              Outcome Evaluation: Pt admitted for dyspnea, acute congestive heart failure. Pt is alert and orientede, 2L nasal cannula. Pt slept in and out throughout the night with no new complain at this time.

## 2025-06-06 ENCOUNTER — READMISSION MANAGEMENT (OUTPATIENT)
Dept: CALL CENTER | Facility: HOSPITAL | Age: 35
End: 2025-06-06

## 2025-06-06 VITALS
HEIGHT: 71 IN | WEIGHT: 246.69 LBS | DIASTOLIC BLOOD PRESSURE: 75 MMHG | HEART RATE: 106 BPM | RESPIRATION RATE: 23 BRPM | BODY MASS INDEX: 34.54 KG/M2 | SYSTOLIC BLOOD PRESSURE: 109 MMHG | TEMPERATURE: 97.5 F | OXYGEN SATURATION: 97 %

## 2025-06-06 PROBLEM — I50.22 CHRONIC HFREF (HEART FAILURE WITH REDUCED EJECTION FRACTION): Status: ACTIVE | Noted: 2025-06-06

## 2025-06-06 RX ORDER — METOPROLOL SUCCINATE 25 MG/1
25 TABLET, EXTENDED RELEASE ORAL EVERY 12 HOURS SCHEDULED
Qty: 60 TABLET | Refills: 0 | Status: SHIPPED | OUTPATIENT
Start: 2025-06-06 | End: 2025-06-11 | Stop reason: SDUPTHER

## 2025-06-06 RX ORDER — SPIRONOLACTONE 25 MG/1
25 TABLET ORAL DAILY
Qty: 30 TABLET | Refills: 0 | Status: SHIPPED | OUTPATIENT
Start: 2025-06-07 | End: 2025-06-11

## 2025-06-06 RX ADMIN — SACUBITRIL AND VALSARTAN 1 TABLET: 24; 26 TABLET, FILM COATED ORAL at 07:53

## 2025-06-06 RX ADMIN — Medication 10 ML: at 07:52

## 2025-06-06 RX ADMIN — METOPROLOL SUCCINATE 25 MG: 25 TABLET, EXTENDED RELEASE ORAL at 07:52

## 2025-06-06 RX ADMIN — SPIRONOLACTONE 25 MG: 25 TABLET ORAL at 07:53

## 2025-06-06 NOTE — DISCHARGE SUMMARY
Date of Discharge:  6/6/2025    Discharge Diagnosis:   Dyspnea  Acute HFrEF due to systolic dysfunction of unclear etiology  Cardiomyopathy  Severe mitral regurgitation  Sinus tachycardia  Elevated blood pressure reading  Acute kidney failure  Obesity (BMI 30-39.9)  Severe LV dysfunction  Mitral regurgitation    Presenting Problem/History of Present Illness  Active Hospital Problems    Diagnosis  POA    **Dyspnea [R06.00]  Yes    Chronic HFrEF (heart failure with reduced ejection fraction) [I50.22]  Yes    Sinus tachycardia [R00.0]  Yes    Elevated blood pressure reading [R03.0]  Yes    Acute kidney failure [N17.9]  Yes    Obesity (BMI 30-39.9) [E66.9]  Yes    Acute HFrEF (heart failure with reduced ejection fraction) [I50.21]  Yes    Cardiomyopathy [I42.9]  Yes      Resolved Hospital Problems   No resolved problems to display.          Hospital Course    Patient is a 35 y.o. male presented with no significant past medical history who presents with progressive shortness of breath x 1 month. Patient was found to have acute HFrEF due to systolic dysfunction of unclear etiology, cardiomyopathy, severe mitral regurgitation, and sinus tachycardia.  Echocardiogram revealed severe LV dysfunction with an ejection fraction of 19 % with restrictive pattern and severe MR.  Patient underwent cardiac cath on 6/4 which revealed no obstructive CAD, severe LV enlargement with severe global left lobar hypokinesis, LVEF of 10 to 15% with elevated LVEDP of 24.  Will continue medical management with metoprolol, Entresto and Aldactone as tolerated.  Patient does not have insurance but has worked out to get the LifeVest and will have a free month of Entresto.  Will need to follow-up with cardiology to recheck LV function and consider ICD as outpatient.  Need to also follow-up with PCP in 2 weeks.    Procedures Performed    Procedure(s):  Left Heart Cath  -------------------       Consults:   Consults       Date and Time Order Name  Status Description    6/3/2025  9:51 AM Inpatient Cardiology Consult Completed     6/3/2025  8:28 AM Family Medicine Consult              Pertinent Test Results:    Lab Results (most recent)       Procedure Component Value Units Date/Time    Potassium [[id number]]  (Normal) Collected: 06/05/25 1344    Specimen: Blood from Arm, Left Updated: 06/05/25 1432     Potassium 4.9 mmol/L     Basic Metabolic Panel [[id number]]  (Abnormal) Collected: 06/05/25 0013    Specimen: Blood from Arm, Left Updated: 06/05/25 0044     Glucose 169 mg/dL      BUN 15.5 mg/dL      Creatinine 1.16 mg/dL      Sodium 138 mmol/L      Potassium 3.6 mmol/L      Chloride 100 mmol/L      CO2 21.9 mmol/L      Calcium 9.2 mg/dL      BUN/Creatinine Ratio 13.4     Anion Gap 16.1 mmol/L      eGFR 83.7 mL/min/1.73     Narrative:      GFR Categories in Chronic Kidney Disease (CKD)              GFR Category          GFR (mL/min/1.73)    Interpretation  G1                    90 or greater        Normal or high (1)  G2                    60-89                Mild decrease (1)  G3a                   45-59                Mild to moderate decrease  G3b                   30-44                Moderate to severe decrease  G4                    15-29                Severe decrease  G5                    14 or less           Kidney failure    (1)In the absence of evidence of kidney disease, neither GFR category G1 or G2 fulfill the criteria for CKD.    eGFR calculation 2021 CKD-EPI creatinine equation, which does not include race as a factor    CBC & Differential [[id number]]  (Abnormal) Collected: 06/05/25 0013    Specimen: Blood from Arm, Left Updated: 06/05/25 0022    Narrative:      The following orders were created for panel order CBC & Differential.  Procedure                               Abnormality         Status                     ---------                               -----------         ------                     CBC Auto Differential[[id number]]         Abnormal            Final result                 Please view results for these tests on the individual orders.    CBC Auto Differential [[id number]]  (Abnormal) Collected: 06/05/25 0013    Specimen: Blood from Arm, Left Updated: 06/05/25 0022     WBC 8.54 10*3/mm3      RBC 6.01 10*6/mm3      Hemoglobin 17.6 g/dL      Hematocrit 51.5 %      MCV 85.7 fL      MCH 29.3 pg      MCHC 34.2 g/dL      RDW 12.5 %      RDW-SD 38.8 fl      MPV 10.9 fL      Platelets 274 10*3/mm3      Neutrophil % 71.7 %      Lymphocyte % 19.6 %      Monocyte % 6.9 %      Eosinophil % 1.2 %      Basophil % 0.4 %      Immature Grans % 0.2 %      Neutrophils, Absolute 6.13 10*3/mm3      Lymphocytes, Absolute 1.67 10*3/mm3      Monocytes, Absolute 0.59 10*3/mm3      Eosinophils, Absolute 0.10 10*3/mm3      Basophils, Absolute 0.03 10*3/mm3      Immature Grans, Absolute 0.02 10*3/mm3      nRBC 0.0 /100 WBC     Basic Metabolic Panel [[id number]]  (Abnormal) Collected: 06/04/25 0400    Specimen: Blood from Arm, Left Updated: 06/04/25 0525     Glucose 165 mg/dL      BUN 11.5 mg/dL      Creatinine 1.26 mg/dL      Sodium 138 mmol/L      Potassium 3.9 mmol/L      Comment: Specimen hemolyzed.  Result may be falsely elevated.        Chloride 100 mmol/L      CO2 24.7 mmol/L      Calcium 9.1 mg/dL      BUN/Creatinine Ratio 9.1     Anion Gap 13.3 mmol/L      eGFR 75.8 mL/min/1.73     Narrative:      GFR Categories in Chronic Kidney Disease (CKD)              GFR Category          GFR (mL/min/1.73)    Interpretation  G1                    90 or greater        Normal or high (1)  G2                    60-89                Mild decrease (1)  G3a                   45-59                Mild to moderate decrease  G3b                   30-44                Moderate to severe decrease  G4                    15-29                Severe decrease  G5                    14 or less           Kidney failure    (1)In the absence of evidence of kidney disease, neither GFR  category G1 or G2 fulfill the criteria for CKD.    eGFR calculation 2021 CKD-EPI creatinine equation, which does not include race as a factor    CBC & Differential [[id number]]  (Normal) Collected: 06/04/25 0400    Specimen: Blood from Arm, Left Updated: 06/04/25 0450    Narrative:      The following orders were created for panel order CBC & Differential.  Procedure                               Abnormality         Status                     ---------                               -----------         ------                     CBC Auto Differential[[id number]]        Normal              Final result                 Please view results for these tests on the individual orders.    CBC Auto Differential [[id number]]  (Normal) Collected: 06/04/25 0400    Specimen: Blood from Arm, Left Updated: 06/04/25 0450     WBC 7.06 10*3/mm3      RBC 5.60 10*6/mm3      Hemoglobin 16.3 g/dL      Hematocrit 48.6 %      MCV 86.8 fL      MCH 29.1 pg      MCHC 33.5 g/dL      RDW 12.6 %      RDW-SD 40.0 fl      MPV 11.5 fL      Platelets 263 10*3/mm3      Neutrophil % 68.8 %      Lymphocyte % 21.4 %      Monocyte % 7.9 %      Eosinophil % 1.3 %      Basophil % 0.3 %      Immature Grans % 0.3 %      Neutrophils, Absolute 4.86 10*3/mm3      Lymphocytes, Absolute 1.51 10*3/mm3      Monocytes, Absolute 0.56 10*3/mm3      Eosinophils, Absolute 0.09 10*3/mm3      Basophils, Absolute 0.02 10*3/mm3      Immature Grans, Absolute 0.02 10*3/mm3      nRBC 0.0 /100 WBC     TSH [[id number]]  (Normal) Collected: 06/03/25 0832    Specimen: Blood from Arm, Left Updated: 06/03/25 1102     TSH 1.860 uIU/mL     T4, Free [[id number]]  (Normal) Collected: 06/03/25 0832    Specimen: Blood from Arm, Left Updated: 06/03/25 1102     Free T4 1.49 ng/dL     High Sensitivity Troponin T 1Hr [[id number]]  (Abnormal) Collected: 06/03/25 0832    Specimen: Blood from Arm, Left Updated: 06/03/25 0900     HS Troponin T 32 ng/L      Troponin T Numeric Delta 9 ng/L      Troponin T  % Delta 39    Narrative:      High Sensitive Troponin T Reference Range:  <14.0 ng/L- Negative Female for AMI  <22.0 ng/L- Negative Male for AMI  >=14 - Abnormal Female indicating possible myocardial injury.  >=22 - Abnormal Male indicating possible myocardial injury.   Clinicians would have to utilize clinical acumen, EKG, Troponin, and serial changes to determine if it is an Acute Myocardial Infarction or myocardial injury due to an underlying chronic condition.         Respiratory Panel PCR w/COVID-19(SARS-CoV-2) [facility] In-House, NP Swab in UTM/VTM, 2 HR TAT - Swab, Nasopharynx [[id number]]  (Normal) Collected: 06/03/25 0721    Specimen: Swab from Nasopharynx Updated: 06/03/25 0815     ADENOVIRUS, PCR Not Detected     Coronavirus 229E Not Detected     Coronavirus HKU1 Not Detected     Coronavirus NL63 Not Detected     Coronavirus OC43 Not Detected     COVID19 Not Detected     Human Metapneumovirus Not Detected     Human Rhinovirus/Enterovirus Not Detected     Influenza A PCR Not Detected     Influenza B PCR Not Detected     Parainfluenza Virus 1 Not Detected     Parainfluenza Virus 2 Not Detected     Parainfluenza Virus 3 Not Detected     Parainfluenza Virus 4 Not Detected     RSV, PCR Not Detected     Bordetella pertussis pcr Not Detected     Bordetella parapertussis PCR Not Detected     Chlamydophila pneumoniae PCR Not Detected     Mycoplasma pneumo by PCR Not Detected    Narrative:      In the setting of a positive respiratory panel with a viral infection PLUS a negative procalcitonin without other underlying concern for bacterial infection, consider observing off antibiotics or discontinuation of antibiotics and continue supportive care. If the respiratory panel is positive for atypical bacterial infection (Bordetella pertussis, Chlamydophila pneumoniae, or Mycoplasma pneumoniae), consider antibiotic de-escalation to target atypical bacterial infection.    Comprehensive Metabolic Panel  [[id number]]  (Abnormal) Collected: 06/03/25 0721    Specimen: Blood Updated: 06/03/25 0801     Glucose 199 mg/dL      BUN 9.1 mg/dL      Creatinine 1.28 mg/dL      Sodium 135 mmol/L      Potassium 4.6 mmol/L      Comment: Specimen hemolyzed.  Result may be falsely elevated.        Chloride 100 mmol/L      CO2 21.1 mmol/L      Calcium 9.2 mg/dL      Total Protein 7.1 g/dL      Albumin 4.5 g/dL      ALT (SGPT) 64 U/L      AST (SGOT) 41 U/L      Alkaline Phosphatase 48 U/L      Total Bilirubin 0.8 mg/dL      Globulin 2.6 gm/dL      A/G Ratio 1.7 g/dL      BUN/Creatinine Ratio 7.1     Anion Gap 13.9 mmol/L      eGFR 74.4 mL/min/1.73     Narrative:      GFR Categories in Chronic Kidney Disease (CKD)              GFR Category          GFR (mL/min/1.73)    Interpretation  G1                    90 or greater        Normal or high (1)  G2                    60-89                Mild decrease (1)  G3a                   45-59                Mild to moderate decrease  G3b                   30-44                Moderate to severe decrease  G4                    15-29                Severe decrease  G5                    14 or less           Kidney failure    (1)In the absence of evidence of kidney disease, neither GFR category G1 or G2 fulfill the criteria for CKD.    eGFR calculation 2021 CKD-EPI creatinine equation, which does not include race as a factor    Magnesium [[id number]]  (Normal) Collected: 06/03/25 0721    Specimen: Blood Updated: 06/03/25 0801     Magnesium 2.0 mg/dL     High Sensitivity Troponin T [[id number]]  (Abnormal) Collected: 06/03/25 0721    Specimen: Blood Updated: 06/03/25 0757     HS Troponin T 23 ng/L     Narrative:      High Sensitive Troponin T Reference Range:  <14.0 ng/L- Negative Female for AMI  <22.0 ng/L- Negative Male for AMI  >=14 - Abnormal Female indicating possible myocardial injury.  >=22 - Abnormal Male indicating possible myocardial injury.   Clinicians would have to utilize clinical  "acumen, EKG, Troponin, and serial changes to determine if it is an Acute Myocardial Infarction or myocardial injury due to an underlying chronic condition.         BNP [[id number]]  (Abnormal) Collected: 06/03/25 0721    Specimen: Blood Updated: 06/03/25 0757     proBNP 807.0 pg/mL     Narrative:      This assay is used as an aid in the diagnosis of individuals suspected of having heart failure. It can be used as an aid in the diagnosis of acute decompensated heart failure (ADHF) in patients presenting with signs and symptoms of ADHF to the emergency department (ED). In addition, NT-proBNP of <300 pg/mL indicates ADHF is not likely.    Age Range Result Interpretation  NT-proBNP Concentration (pg/mL:      <50             Positive            >450                   Gray                 300-450                    Negative             <300    50-75           Positive            >900                  Gray                300-900                  Negative            <300      >75             Positive            >1800                  Gray                300-1800                  Negative            <300    Procalcitonin [[id number]]  (Normal) Collected: 06/03/25 0721    Specimen: Blood Updated: 06/03/25 0757     Procalcitonin 0.05 ng/mL     Narrative:      As a Marker for Sepsis (Non-Neonates):    1. <0.5 ng/mL represents a low risk of severe sepsis and/or septic shock.  2. >2 ng/mL represents a high risk of severe sepsis and/or septic shock.    As a Marker for Lower Respiratory Tract Infections that require antibiotic therapy:    PCT on Admission    Antibiotic Therapy       6-12 Hrs later    >0.5                Strongly Recommended  >0.25 - <0.5        Recommended   0.1 - 0.25          Discouraged              Remeasure/reassess PCT  <0.1                Strongly Discouraged     Remeasure/reassess PCT    As 28 day mortality risk marker: \"Change in Procalcitonin Result\" (>80% or <=80%) if Day 0 (or Day 1) and Day 4 values are " available. Refer to http://www.[facility]-pct-calculator.com    Change in PCT <=80%  A decrease of PCT levels below or equal to 80% defines a positive change in PCT test result representing a higher risk for 28-day all-cause mortality of patients diagnosed with severe sepsis for septic shock.    Change in PCT >80%  A decrease of PCT levels of more than 80% defines a negative change in PCT result representing a lower risk for 28-day all-cause mortality of patients diagnosed with severe sepsis or septic shock.       TSH Rfx On Abnormal To Free T4 [[id number]]  (Normal) Collected: 06/03/25 0721    Specimen: Blood Updated: 06/03/25 0757     TSH 1.990 uIU/mL     Protime-INR [[id number]]  (Normal) Collected: 06/03/25 0721    Specimen: Blood Updated: 06/03/25 0740     Protime 13.9 Seconds      INR 1.07    aPTT [[id number]]  (Normal) Collected: 06/03/25 0721    Specimen: Blood Updated: 06/03/25 0740     PTT 23.1 seconds     Extra Tubes [[id number]] Collected: 06/03/25 0721    Specimen: Blood, Venous Line Updated: 06/03/25 0730    Narrative:      The following orders were created for panel order Extra Tubes.  Procedure                               Abnormality         Status                     ---------                               -----------         ------                     Gold Top - SST[[id number]]                                   Final result                 Please view results for these tests on the individual orders.    Gold Top - SST [[id number]] Collected: 06/03/25 0721    Specimen: Blood Updated: 06/03/25 0730     Extra Tube Hold for add-ons.     Comment: Auto resulted.                Results for orders placed during the hospital encounter of 06/03/25    Adult Transthoracic Echo Complete W/ Cont if Necessary Per Protocol    Interpretation Summary    Left ventricular systolic function is severely decreased. Calculated left ventricular EF = 19.1%    The left ventricular cavity is moderate to severely dilated.    Left  ventricular diastolic function is consistent with (grade III w/high LAP) fixed restrictive pattern.  Average GLS -2.8%.    Moderately reduced right ventricular systolic function noted.    The right ventricular cavity is dilated.    Left atrial volume is moderately increased.    Abnormal mitral valve structure consistent with dilated annulus.    Severe mitral valve regurgitation is present.    Estimated right ventricular systolic pressure from tricuspid regurgitation is mildly elevated (35-45 mmHg).       Condition on Discharge:  Stable    Vital Signs  Temp:  [97.5 °F (36.4 °C)-98.1 °F (36.7 °C)] 97.5 °F (36.4 °C)  Heart Rate:  [102-112] 106  Resp:  [18-26] 23  BP: (104-114)/(73-78) 109/75      Physical Exam  Vitals reviewed.   Constitutional:       Appearance: He is not ill-appearing.   HENT:      Head: Normocephalic and atraumatic.      Right Ear: External ear normal.      Left Ear: External ear normal.      Nose: Nose normal.      Mouth/Throat:      Mouth: Mucous membranes are moist.   Eyes:      General:         Right eye: No discharge.         Left eye: No discharge.   Cardiovascular:      Rate and Rhythm: Normal rate and regular rhythm.      Pulses: Normal pulses.      Heart sounds: Normal heart sounds.   Pulmonary:      Effort: Pulmonary effort is normal.      Breath sounds: Normal breath sounds.   Abdominal:      General: Bowel sounds are normal.      Palpations: Abdomen is soft.   Musculoskeletal:         General: Normal range of motion.      Cervical back: Normal range of motion.   Skin:     General: Skin is warm and dry.   Neurological:      Mental Status: He is alert and oriented to person, place, and time.   Psychiatric:         Behavior: Behavior normal.              Discharge Disposition      Discharge Medications     Discharge Medications        New Medications        Instructions Start Date   metoprolol succinate XL 25 MG 24 hr tablet  Commonly known as: TOPROL-XL   25 mg, Oral, Every 12 Hours  Scheduled      sacubitril-valsartan 24-26 MG tablet  Commonly known as: ENTRESTO   1 tablet, Oral, Every 12 Hours Scheduled      spironolactone 25 MG tablet  Commonly known as: ALDACTONE   25 mg, Oral, Daily   Start Date: June 7, 2025            Continue These Medications        Instructions Start Date   albuterol sulfate  (90 Base) MCG/ACT inhaler  Commonly known as: PROVENTIL HFA;VENTOLIN HFA;PROAIR HFA   1 puff, Every 4 to 6 Hours PRN      Testosterone Cypionate 200 MG/ML solution   1.5 mL, Every 7 Days             Stop These Medications      dextromethorphan polistirex ER 30 MG/5ML Suspension Extended Release oral suspension  Commonly known as: DELSYM     glipizide 5 MG ER tablet  Commonly known as: GLUCOTROL XL     metFORMIN 500 MG tablet  Commonly known as: GLUCOPHAGE     oxyCODONE 10 MG tablet  Commonly known as: ROXICODONE              Discharge Diet:   Diet Instructions       Diet: Cardiac Diets; Low Sodium (2g); Regular (IDDSI 7); Thin (IDDSI 0)      Discharge Diet: Cardiac Diets    Cardiac Diet: Low Sodium (2g)    Texture: Regular (IDDSI 7)    Fluid Consistency: Thin (IDDSI 0)            Activity at Discharge:   Activity Instructions       Activity as Tolerated              Follow-up Appointments  Future Appointments   Date Time Provider Department Center   6/11/2025  2:00 PM Chemo Sánchez APRN [facility] None   7/7/2025  2:15 PM Renata Rosenberg APRN [facility]     Additional Instructions for the Follow-ups that You Need to Schedule       Discharge Follow-up with PCP   As directed       Currently Documented PCP:    Logan Del Rio MD    PCP Phone Number:    [phone]     Follow Up Details: 2 weeks        Discharge Follow-up with Specified Provider: cardiology keep current appoitment   As directed      To: cardiology keep current appoitment                Test Results Pending at Discharge  Pending Results       None             OWEN Alicia  06/06/25  09:19 EDT    Time:  Discharge 25 min

## 2025-06-06 NOTE — PROGRESS NOTES
Enter Query Response Below      Query Response: DAYAMI is not supported.              If applicable, please update the problem list.

## 2025-06-06 NOTE — PLAN OF CARE
Goal Outcome Evaluation:  Plan of Care Reviewed With: patient        Progress: improving  Outcome Evaluation: Patient rested well over night. Patient will d/c home today once LifeVest is delivered. No new issues or complaints. Will continue to monitor.

## 2025-06-06 NOTE — PLAN OF CARE
Goal Outcome Evaluation:  Plan of Care Reviewed With: patient   Pt lifevest to be delivered at 1000, d/c pending delivery     Progress: improving

## 2025-06-06 NOTE — CASE MANAGEMENT/SOCIAL WORK
Continued Stay Note  MARIELLA Cook     Patient Name: Twan Santos  MRN: [id number]  Today's Date: 6/6/2025    Admit Date: 6/3/2025    Plan: Return home with family. New [employer] Lifevest.   Discharge Plan       Row Name 06/06/25 1154       Plan    Plan Return home with family. New [employer] Lifevest.    Patient/Family in Agreement with Plan yes    Plan Comments CM received phone call from [employer] that confirmed approval for pt's vest and pt has paid down payment. [employer] rep in today at 10 AM to fit pt with vest. Updated care team.             Megan Naegele, RN     Office Phone: [phone]  Office Cell: [phone]

## 2025-06-06 NOTE — CASE MANAGEMENT/SOCIAL WORK
Case Management Discharge Note      Final Note: Home.      Selected Continued Care - Discharged on 6/6/2025 Admission date: 6/3/2025 - Discharge disposition: Home or Self Care      Durable Medical Equipment Coordination complete.      Service Provider Services Address Phone Fax Patient Preferred    [employer] [address], [place] [address] [phone] --             Transportation Services  Private: Car    Final Discharge Disposition Code: 01 - home or self-care

## 2025-06-06 NOTE — OUTREACH NOTE
Prep Survey      Flowsheet Row Responses   [facility] facility patient discharged from? [facility]   Is LACE score < 7 ? No   Eligibility Readm Mgmt   Discharge diagnosis Acute HFrEF due to systolic dysfunction of unclear etiology   Does the patient have one of the following disease processes/diagnoses(primary or secondary)? CHF   Is there a DME ordered? Yes   What DME was ordered? New [employer] Lifevest.   Prep survey completed? Yes            Mell BARRAZA - Registered Nurse

## 2025-06-09 ENCOUNTER — READMISSION MANAGEMENT (OUTPATIENT)
Dept: CALL CENTER | Facility: HOSPITAL | Age: 35
End: 2025-06-09

## 2025-06-09 NOTE — OUTREACH NOTE
CHF Week 1 Survey      Flowsheet Row Responses   [facility] patient discharged from? [facility]   Does the patient have one of the following disease processes/diagnoses(primary or secondary)? CHF   CHF Week 1 attempt successful? Yes   Call start time 1549   Call end time 1554   Discharge diagnosis Acute HFrEF due to systolic dysfunction of unclear etiology   Person spoke with today (if not patient) and relationship Spouse   Meds reviewed with patient/caregiver? Yes   Is the patient having any side effects they believe may be caused by any medication additions or changes? No   Does the patient have all medications ordered at discharge? Yes   Is the patient taking all medications as directed (includes completed medication regime)? Yes   Comments regarding appointments Cards appt on 7/7/25   Does the patient have a primary care provider?  Yes   Does the patient have an appointment with their PCP within 7 days of discharge? Greater than 7 days   Comments regarding PCP 7/10/25   What is preventing the patient from scheduling follow up appointments within 7 days of discharge? Waiting on return call  [spouse is trying to change appt to earlier date, waiting on call back]   Nursing Interventions Verified appointment date/time/provider   Has the patient kept scheduled appointments due by today? N/A   Has home health visited the patient within 72 hours of discharge? N/A   What DME was ordered? [employer]   Has all DME been delivered? Yes   Pulse Ox monitoring None   Psychosocial issues? No   Comments pt monitors b/p and weight daily   Did the patient receive a copy of their discharge instructions? Yes   Nursing interventions Reviewed instructions with patient   What is the patient's perception of their health status since discharge? Improving   Nursing interventions Nurse provided patient education   Is the patient/caregiver able to teach back the hierarchy of who to call/visit for symptoms/problems? PCP, Specialist,  Home health nurse, Urgent Care, ED, 911 Yes   CHF Zone this Call Green Zone   Green Zone Patient reports doing well   Green Zone Interventions Daily weight check, Low sodium diet, Meds as directed    CHF Week 1 call completed? Yes   Is the patient interested in additional calls from an ambulatory ? No   Would this patient benefit from a Referral to [facility] Social Work? No   Call end time [phone]            Inga FAITH - Registered Nurse

## 2025-06-10 PROBLEM — N18.2 CKD (CHRONIC KIDNEY DISEASE) STAGE 2, GFR 60-89 ML/MIN: Chronic | Status: ACTIVE | Noted: 2025-06-10

## 2025-06-10 PROBLEM — R73.03 PREDIABETES: Chronic | Status: ACTIVE | Noted: 2025-06-10

## 2025-06-10 PROBLEM — E88.810 METABOLIC SYNDROME X: Status: ACTIVE | Noted: 2025-06-10

## 2025-06-10 PROBLEM — I25.10 MILD CAD: Status: ACTIVE | Noted: 2025-06-10

## 2025-06-10 PROBLEM — I42.9 CARDIOMYOPATHY: Chronic | Status: RESOLVED | Noted: 2025-06-03 | Resolved: 2025-06-10

## 2025-06-10 PROBLEM — I50.22 CHRONIC HFREF (HEART FAILURE WITH REDUCED EJECTION FRACTION): Status: RESOLVED | Noted: 2025-06-06 | Resolved: 2025-06-10

## 2025-06-10 PROBLEM — I50.21 ACUTE HFREF (HEART FAILURE WITH REDUCED EJECTION FRACTION): Chronic | Status: RESOLVED | Noted: 2025-06-03 | Resolved: 2025-06-10

## 2025-06-10 PROBLEM — I42.0 NONISCHEMIC DILATED CARDIOMYOPATHY: Status: ACTIVE | Noted: 2025-06-10

## 2025-06-10 PROBLEM — I38 VALVULAR HEART DISEASE: Status: ACTIVE | Noted: 2025-06-10

## 2025-06-10 NOTE — PROGRESS NOTES
"  [facility]  P. T. \"Mack\" OWEN Sánchez, [facility]          Twan Santos is a 35 y.o.  History of Present Illness  35 y.o. male presented with no significant past medical history with a past medical history of notedly diagnosed nonischemic dilated cardiomyopathy (LVIDD at 6 cm] (TTE 6/3/25) EF 19%, grade 2 diastolic dysfunction, valvular heart disease with severe MR, recent DAYAMI with stage II underlying CKD, sinus tachycardia with heart rate up in the 120s while hospitalized recently, recent bilateral pleural effusions low testosterone level, metabolic syndrome with hypertension, elevated BMI at 34.4 kg/m² as well as prediabetes VS T2DM.presented to the [facility] ED 3 Vidhi 2025 with a 1 month history of shortness of breath. Patient reports that initially he started with some dyspnea back in March 2025. He thought it was allergy related.  Had an elevated proBNP listed at 807 after diuresis he comes back for his initial heart failure visit.  [facility] on 11 June 2025 since recent hospitalization patient reports he has done fairly well has been trying very much to limit his salt however he has taken considerable amount of Mrs. Noguera.            Subjective        Review of Systems -  Constitutional: + weakness, + fatigue, persist but not at a significant level no fever, rigors, chills   Eyes: No recent vision changes, eye pain   ENT/oropharynx: No recent difficulty swallowing, sore throat, epistaxis, changes in hearing   Cardiovascular: No recent chest pain, chest tightness, palpitations except as noted in HPI, paroxysmal nocturnal dyspnea, orthopnea, diaphoresis, dizziness & no pre or piedad syncopal episodes   Respiratory: No at rest shortness of breath, only minimal dyspnea on exertion, as he can walk much greater than 2 blocks without getting dyspneic no significant productive cough, no wheezing and no hemoptysis   Gastrointestinal: No abdominal pain, nausea, vomiting, diarrhea, bloody " stools   Genitourinary: No hematuria, dysuria other than increased frequency   Neurological: No headache, tremors, numbness,  or hemiparesis    Musculoskeletal: No cramps, myalgias,  joint pain, joint swelling   Integument: No recent rash, no edema         Patient Active Problem List   Diagnosis    Dyspnea    Propensity for sinus tachycardia    Elevated blood pressure reading    Acute kidney failure    Obesity (BMI 30-39.9)    Nonischemic dilated cardiomyopathy    Valvular heart disease    CKD (chronic kidney disease) stage 2, GFR 60-89 ml/min    Prediabetes vs T2DM    Nonobstructive CAD    Metabolic syndrome X    mild Hyperkalemia    DAYAMI (acute kidney injury)     family history is not on file.   reports that he has never smoked. He has never used smokeless tobacco. He reports that he does not currently use alcohol. He reports that he does not use drugs.  No Known Allergies  Physical Activity: Inactive (6/3/2025)    Exercise Vital Sign     Days of Exercise per Week: 0 days     Minutes of Exercise per Session: 0 min          Current Outpatient Medications:     metoprolol succinate XL (TOPROL-XL) 25 MG 24 hr tablet, Take 0.5 tablets by mouth Every 12 (Twelve) Hours. Hold for systolic blood pressure <95 and/or a heart < 60, Disp: 90 tablet, Rfl: 2    sacubitril-valsartan (ENTRESTO) 24-26 MG tablet, Take 1 tablet by mouth Every 12 (Twelve) Hours., Disp: 60 tablet, Rfl: 1    [START ON 6/16/2025] spironolactone (ALDACTONE) 25 MG tablet, Take 0.5 tablets by mouth Daily., Disp: 45 tablet, Rfl: 2    Testosterone Cypionate 200 MG/ML solution, Inject 1.5 mL into the appropriate muscle as directed by prescriber Every 7 (Seven) Days., Disp: , Rfl:     Objective   Vital Sign Review:   Vitals:    06/11/25 1410   BP: 95/66   Pulse: 100   Resp: 18   SpO2: 97%     Body mass index is 32.58 kg/m².      06/11/25  1410   Weight: 106 kg (233 lb 9.6 oz)       Physical Exam:    General:  Well-developed, slightly overly well-nourished,  cooperative, no distress, appears stated age    Neuro:  A&O x3. No significantly obvious focal neuro deficet    Psych:  Pleasant - mildly flattened affect    HENT:  Normocephalic, atraumatic, moist mucous membranes , bearded, normal ear placement,Throat not injected   Eyes:  PERRLA, Conjunctivae not injected, EOM's intact, without any arcus senilis, conjunctiva does not appear significantly injected   Neck:  Supple, very mildly thickened, no lymphadenopathy nor thyromegaly no JVD or bruit    Lungs:    Symmetrical rise & fall of chest with baseline respiratory pattern. To auscultation lungs are Clear bilaterally, no active wheezes, no rhonchi or rales are noted bases of the lung   Chest wall:  Currently he is wearing LifeVest no significant tenderness when palpated. PMI @ 6th ICS MAL   Heart:  Rate mid 90s and regular rhythm, S1 and S2 normal, no S3 or S4, Gr II-III/VI systolic ejection murmur best heard at the apex , no obvious rub, click or gallop.    Abdomen:  non-distended, non-tender, bowel sounds noted in the 4 quadrants of the abdomen, w/o significant central abdominal adipose tissue identified    Extremities:  Equal color motion temperature and sensitivity, Rapid capillary refill noted within the nailbeds of fingers and toes bilaterally no identifiable lower extremity edema.    Pulses:  2+ and symmetric all extremities, rapid capillary refill    Skin:  No obvious rashes, significant lesions identified, warm dry and of normal turgor         DATA REVIEWED:   EKG:      ---------------------------------------------------  ECHO:    Results for orders placed during the hospital encounter of 06/03/25    Adult Transthoracic Echo Complete W/ Cont if Necessary Per Protocol    Interpretation Summary    Left ventricular systolic function is severely decreased. Calculated left ventricular EF = 19.1%    The left ventricular cavity is moderate to severely dilated.    Left ventricular diastolic function is consistent with  (grade III w/high LAP) fixed restrictive pattern.  Average GLS -2.8%.    Moderately reduced right ventricular systolic function noted.    The right ventricular cavity is dilated.    Left atrial volume is moderately increased.    Abnormal mitral valve structure consistent with dilated annulus.    Severe mitral valve regurgitation is present.    Estimated right ventricular systolic pressure from tricuspid regurgitation is mildly elevated (35-45 mmHg).          -----------------------------------------------------  RHC/LHC    Results for orders placed during the hospital encounter of 25    Cardiac Catheterization/Vascular Study    Conclusion  Table formatting from the original result was not included.  2025      Heart Cath Report    NAME:              Twan Santos  :                1990  AGE/SEX:        35 y.o. male  MRN:                [id number]        Procedures Performed    Left heart catheterization  Selective coronary angiography  Left ventriculography  Mynx closure device    :   Davey Oliveira MD    Vascular Access Site: Femoral    Indication for procedure: Shortness of breath, acute HFrEF, elevated troponin, severe MR, cardiomyopathy with EF less than 20%      Procedure Note    After discussing the risks, benefits, and alternatives of the procedure, informed consent was obtained.  Timeout was done before the procedure.  Moderate conscious sedation was given utilizing IV Versed and fentanyl administered by RN with continuous EKG oximetry and hemodynamic monitoring supervised by me throughout the entire case.  I was present with the patient for the duration of moderate sedation and supervised staff who had no other duties and monitored the patient for the entire procedure patient had Silveira 2-3 sedation scale. the vascular access site was prepped and draped in the usual sterile fashion.  2% lidocaine was used for local anesthesia. Appropriate landmarks were assessed.  A 6  Thai short sheath was inserted in the artery using the modified Seldinger technique.    Selective coronary angiography was performed with JL4 and JR4 diagnostic catheters. Left ventriculogram  was performed with an angled pigtail catheter.  All exchanges were performed over the wire.  No specimens were removed.  There were no apparent acute or early complications.  The patient tolerated the procedure well and was transferred to the recovery area in stable condition.    Closure device: Mynx device was deployed successfully after right iliofemoral angiogram was performed. Good hemostasis was achieved.    Complications:  None  Blood Loss: minimal    Hemodynamics    Pressures    Ao:    128/80 mmHg  LV:    124/24 mmHg  End-diastolic pressure:  20--24  mmHg  No significant aortic valvular gradient on pullback    Coronary Angiography    Left Main :  The left main   large vessel without disease    Left Anterior Descending : Good caliber vessel gives rise to diagonals and septals without disease    Left Circumflex : Gives rise to large principal marginal continues in the AV groove gives rise to 2 more branches without disease    Right Coronary Artery :  The right coronary artery   Is dominant artery without disease    Dominance:  []  Left  [x]  Right  []  Co-Dominant      Left Ventriculography:    Estimated Ejection Fraction: 15 %  Wall motion abnormalities: LV with severe global left ventricular hypokinesis  Mitral Regurgitation: 2-3+ MR    Impression:    Nonobstructive CAD  LV with severe LV dysfunction  Elevated LVEDP  Mitral regurgitation    Recommendations:    Medical management risk factor modification  Outpatient follow-up with structural heart to evaluate and treat MR        I sincerely appreciate the opportunity to participate in your patient's care. Please feel free to contact me anytime if I can be of assistance in this or any other way.      Pertinent History  History reviewed. No pertinent past medical  history.  History reviewed. No pertinent surgical history.  Prior to Admission medications  Medication Sig Start Date End Date Taking? Authorizing Provider  albuterol sulfate  (90 Base) MCG/ACT inhaler Inhale 1 puff Every 4 (Four) to 6 (Six) Hours As Needed for Wheezing or Shortness of Air.   Yes Izabel Rios MD  dextromethorphan polistirex ER (DELSYM) 30 MG/5ML Suspension Extended Release oral suspension Take 10 mL by mouth 2 (Two) Times a Day As Needed (cough).   Yes Izabel Rios MD  glipizide (GLUCOTROL XL) 5 MG ER tablet Take 1 tablet by mouth Daily.   Yes Izabel Rios MD  metFORMIN (GLUCOPHAGE) 500 MG tablet Take 2 tablets by mouth 2 (Two) Times a Day With Meals.   Yes Izabel Rios MD  oxyCODONE (ROXICODONE) 10 MG tablet Take 0.5-1 tablets by mouth 3 (Three) Times a Day As Needed for Moderate Pain or Severe Pain.   Yes Izabel Rios MD  Testosterone Cypionate 200 MG/ML solution Inject 1.5 mL into the appropriate muscle as directed by prescriber Every 7 (Seven) Days.   Yes Izabel Rios MD      Pre-Procedure Notes  H&P Performed  [x]  Yes []  No       []  N/A    Indications:  []  ACS <= 24 HRS  []  ACS >24 HRS  []  New Onset Angina <= 2 mos  []  Worsening Angina  []  Resuscitated Cardiac Arrest  []  Angina on Exertion:  []  Suspected CAD  []  Valvular Disease  []  Pericardial Disease  []  Cardiac Arrythmia  []  Cardiomyopathy  []  LV Dysfunction  []  Syncope  []  Post Cardiac Transplant  []  Eval. For Exercise Clearance  []  Other  []  Pre-Operative Evaluation  If Pre-Op Eval:  Evaluation for Surgery Type:  []  Cardiac Surgery   []  Non-Cardiac Surgery  Functional Capacity:  []  <4 METS  []  >=4 METS w/o symptoms  []  >= 4 METS with symptoms  []  Unknown  Surgical Risk:  []  Low  []  Intermediate  []  High Risk: Vascular  []  High Risk Non-Vascular    Risks, Benefits, & Complications Discussed:  [x]  Yes  []  No  []  N/A    Questions Answered:  [x]  Yes   []  No  []  N/A    Consent Obtained:  [x]  Yes  []  No  []  N/A    CHF: [x]  Yes  []  No  If Yes:  Newly Diagnosed?  [x]  Yes  []  No  If Yes:  HF Type:  []  Diastolic  [x]  Systolic  []  Unknown      Davey Oliveira MD  6/4/2025  14:35 EDT  Electronically signed by Davey Oliveira MD, 06/04/25, 2:35 PM EDT.      ---------------------------------------------------------------------------  CXR/Imaging:     Imaging Results (Most Recent)       None                -----------------------------------------------------------------------------  CT:   US Renal Bilateral  Result Date: 6/3/2025  1.No hydronephrosis or renal calculi identified. Electronically Signed: Richy Leung MD  6/3/2025 10:29 AM EDT  Workstation ID: [id number]    CT Angiogram Chest Pulmonary Embolism  Result Date: 6/3/2025  Impression: 1.No evidence of pulmonary embolus. 2.Widespread groundglass attenuation of the lungs without lobar consolidation. Few scattered areas of more dense airspace disease most pronounced in the lingula. Findings are most suggestive of infectious process in particular viral or atypical pathogen.  Noninfectious pulmonary edema is a consideration as well. Correlate with symptoms. 3.Small bilateral pleural effusions. 4.Benign 2.4 cm left adrenal adenoma. Electronically Signed: Kyle Gongora MD  6/3/2025 7:55 AM EDT  Workstation ID: [id number]          --------------------------------------------------------------------------------------------------------------    Laboratory evaluations:    Hospital Outpatient Visit on 06/11/2025   Component Date Value    QT Interval 06/11/2025 354     QTC Interval 06/11/2025 431    Hospital Outpatient Visit on 06/11/2025   Component Date Value    proBNP 06/11/2025 160.0     Glucose 06/11/2025 237 (H)     BUN 06/11/2025 28.5 (H)     Creatinine 06/11/2025 1.59 (H)     Sodium 06/11/2025 131 (L)     Potassium 06/11/2025 5.4 (H)     Chloride 06/11/2025 95 (L)     CO2 06/11/2025 25.7      Calcium 06/11/2025 9.0     BUN/Creatinine Ratio 06/11/2025 17.9     Anion Gap 06/11/2025 10.3     eGFR 06/11/2025 57.3 (L)    No results displayed because visit has over 200 results.          Result Review:  I have personally reviewed the results from the time of this admission to 6/11/2025 16:05 EDT and agree with these findings:  [x]  Laboratory list / accordion  []  Microbiology  []  Radiology  [x]  EKG/Telemetry   []  Cardiology/Vascular   []  Pathology  []  Old records  []  Other:  Most notable findings include: Reduced eGFR from 83 to 57 today proBNP is significantly reduced from 8078 days ago to states today unfortunately glucose was significantly elevated at 237 as well as an elevation noted in her potassium up to 5.4 FYI the creatinine is now 1.6           Diagnoses and all orders for this visit:    1. Nonischemic dilated cardiomyopathy (Primary)  Overview:  A.  LVIDd 6 cm  B.  Chronic combined systolic and diastolic heart failure (HFrEF)   i (TTE 6/3/25) EF 19% grade 3 diastolic dysfunction  C.  LifeVest    Orders:  -     proBNP; Standing  -     proBNP; Standing  -     proBNP    2. Valvular heart disease  Overview:  A.  Severe mitral regurgitation      3. Nonobstructive CAD  Overview:  A. S/p LHC 6/4/25   Nonobstructive CAD  LV with severe LV dysfunction  Elevated LVEDP  Mitral regurgitation  Reduced LVEF       4. RANDHAWA (dyspnea on exertion)  -     proBNP; Standing  -     Basic Metabolic Panel; Standing  -     ECG 12 Lead; Future  -     proBNP; Standing  -     proBNP  -     Basic Metabolic Panel; Standing  -     Basic Metabolic Panel    5. mild Hyperkalemia    Other orders  -     spironolactone (ALDACTONE) 25 MG tablet; Take 0.5 tablets by mouth Daily.  Dispense: 45 tablet; Refill: 2  -     metoprolol succinate XL (TOPROL-XL) 25 MG 24 hr tablet; Take 0.5 tablets by mouth Every 12 (Twelve) Hours. Hold for systolic blood pressure <95 and/or a heart < 60  Dispense: 90 tablet; Refill: 2        NYHA STAGE C;  FC-II.  Clinical status was assessed and has improved.       Today, Patient appears euvolemic. and with  Excellent perfusion. The patient's hemodynamics are currently acceptable. HR is mildly: tachycardic and  BP/MAP was reviewed and there is not room for medication up-titration.    The patient's clinical course has been impacted by Worsening Renal Failure.  As well as hypotension    GDMT Assessment: The patient is currently on his initial goal-directed medical therapy.  With hopes of being able to uptitrate drugs as available  We do not have room to optimize their medications based on HD assessment today, GFR, and electrolytes.     GDMT changes recommended today: Decrease beta-blocker.      BB:   decrease Metoprolol Succinate 12.5mg bid  Monitor heart rate. & Blood pressure Please call the [facility] for HR<60, dizziness, lightheadedness, syncope    A/A/A:     continue Entresto 24/26mg BID  Occasional monitoring of Chem-7 is recommended; call for the development of a new cough or S/Sx angioedema    CVE6C-O:  Will consider starting once he gets insurance next visit  Call for S/Sx genital mycotic infections  Do not take when in adequate oral intake, NPO, GI illness    MRA:  Asked to hold until 16 June 2025 then plan on decrease Spironolactone 12.5mg daily started on 16 June 2025  Initiation instructions: Obtain a CHEM7 after 4-5 doses.  We will call with the results. RTC in 1 week for hemodynamic assessment and repeat laboratory monitoring.  Recommended quarterly assessment of GFR and electrolytes for the first 12 months  After the patient has been on MRA therapy for 12 months without high-risk features, recommended q 6 monthly BMPs and assessment, per guidelines.   Avoid potassium supplementation  \The patient has not required potassium supplementation to maintain adequate serum levels.    Avoid salt substitutes containing potassium  Please hold this medication if diarrhea, vomiting, or infection with fever and sweating  occurs      Diuretic Regimen:    Off diuretics due to: Euvolemic     Labs/Diagnostics/Referrals:    Labs - Did a proBNP and a BMP today   Imaging -No orders placed today   Referrals No referrals placed today however would consider renal colic   Currently Pending: Just the follow-up lab work 1 week       HF-Specific Device Therapy/AHF    ICD/CRT-D  ICD Qualification  Repeat echocardiogram due in late October or early September      [] GDMT X 3 months  [x] EF < 35%  [] QRS Duration @ 105  [] > 150  [] LBBB  [] Atrial Fibrillation  [x] NYHA Class IIc  [x] Life expectancy > 1 year   -Indicated.  But awaiting 90 days  -Indications: Secondary Prevention of Sudden Cardiac Death  -Deferred due to: ,<90-day window     TMVR  -Indicated.   -Deferred due to: Primary cardiologist cardiologist for consideration with establishment with the valvular heart team as he might well benefit from a [facility]_EF<35%_CHOICES:[id number]}   CardioMEMS Not available at [facility]     Discussion     On initial visit to the heart failure clinic he had already been started on several failure core measures including beta-blocker, ARNI,  and MRA inhibitor  Seem to be knowledge overloaded on initial visit was accompanied by his wife  Seems to be sticking to majority of parameters  Actually cut his spironolactone due to hyperkalemia down to 12.5 daily  Decreased due to hypotension with parameters his Toprol-XL to 12.5 every 12 H  Getting labs in 1 week  Plan on seeing again in 2 weeks  Discussed food and fluid restrictions  Will obviously need follow-up echocardiogram at 90 days to remove his LifeVest if his EF is greater than 35 if not then will need to proceed with ICD consideration    I did the following activities preparing for the visit with Chris Rodríguez  including: reviewing tests, once pt arrived in clinic I also performed a medically appropriate examination and/or evaluation, I personally spent considerable time counseling  and educating the patient/family/caregiver, ordering medications, tests, or procedures, referring and communicating with other health care professionals, and documenting information in the medical record. I estimate including preparation 45 minutes          Lifestyle Advice: A hand-out has been provided to the patient.   - call office if I gain more than 3 pounds in one day or 5 pounds in one week   - keep legs up while sitting   - use salt in moderation recommended 2 gms, or 2000 mg daily max   - watch for swelling in feet, ankles and legs every day   - weigh daily   -call for concerning S/Sx   -- activity or exercise based on tolerance encouraged     CODE STATUS: FULL

## 2025-06-11 ENCOUNTER — HOSPITAL ENCOUNTER (OUTPATIENT)
Dept: CARDIOLOGY | Facility: HOSPITAL | Age: 35
Discharge: HOME | End: 2025-06-11
Payer: MEDICAID

## 2025-06-11 ENCOUNTER — DISEASE STATE MANAGEMENT VISIT (OUTPATIENT)
Facility: HOSPITAL | Age: 35
End: 2025-06-11

## 2025-06-11 ENCOUNTER — HOSPITAL ENCOUNTER (OUTPATIENT)
Facility: HOSPITAL | Age: 35
Discharge: HOME | End: 2025-06-11
Payer: MEDICAID

## 2025-06-11 ENCOUNTER — TELEPHONE (OUTPATIENT)
Dept: CARDIAC REHAB | Facility: HOSPITAL | Age: 35
End: 2025-06-11

## 2025-06-11 VITALS
WEIGHT: 233.6 LBS | SYSTOLIC BLOOD PRESSURE: 95 MMHG | OXYGEN SATURATION: 97 % | BODY MASS INDEX: 32.58 KG/M2 | RESPIRATION RATE: 18 BRPM | DIASTOLIC BLOOD PRESSURE: 66 MMHG | HEART RATE: 100 BPM

## 2025-06-11 DIAGNOSIS — I38 VALVULAR HEART DISEASE: ICD-10-CM

## 2025-06-11 DIAGNOSIS — R06.09 DOE (DYSPNEA ON EXERTION): ICD-10-CM

## 2025-06-11 DIAGNOSIS — I25.10 MILD CAD: ICD-10-CM

## 2025-06-11 DIAGNOSIS — E87.5 HYPERKALEMIA: ICD-10-CM

## 2025-06-11 DIAGNOSIS — I42.0 NONISCHEMIC DILATED CARDIOMYOPATHY: Primary | ICD-10-CM

## 2025-06-11 DIAGNOSIS — I50.23 ACUTE ON CHRONIC HFREF (HEART FAILURE WITH REDUCED EJECTION FRACTION): Primary | ICD-10-CM

## 2025-06-11 PROBLEM — N17.9 AKI (ACUTE KIDNEY INJURY): Status: ACTIVE | Noted: 2025-06-11

## 2025-06-11 LAB
ANION GAP SERPL CALCULATED.3IONS-SCNC: 10.3 MMOL/L (ref 5–15)
BUN SERPL-MCNC: 28.5 MG/DL (ref 6–20)
BUN/CREAT SERPL: 17.9 (ref 7–25)
CALCIUM SPEC-SCNC: 9 MG/DL (ref 8.6–10.5)
CHLORIDE SERPL-SCNC: 95 MMOL/L (ref 98–107)
CO2 SERPL-SCNC: 25.7 MMOL/L (ref 22–29)
CREAT SERPL-MCNC: 1.59 MG/DL (ref 0.76–1.27)
EGFRCR SERPLBLD CKD-EPI 2021: 57.3 ML/MIN/1.73
GLUCOSE SERPL-MCNC: 237 MG/DL (ref 65–99)
NT-PROBNP SERPL-MCNC: 160 PG/ML (ref 0–450)
POTASSIUM SERPL-SCNC: 5.4 MMOL/L (ref 3.5–5.2)
QT INTERVAL: 354 MS
QTC INTERVAL: 431 MS
SODIUM SERPL-SCNC: 131 MMOL/L (ref 136–145)

## 2025-06-11 PROCEDURE — 93005 ELECTROCARDIOGRAM TRACING: CPT | Performed by: NURSE PRACTITIONER

## 2025-06-11 PROCEDURE — 80048 BASIC METABOLIC PNL TOTAL CA: CPT | Performed by: NURSE PRACTITIONER

## 2025-06-11 PROCEDURE — 83880 ASSAY OF NATRIURETIC PEPTIDE: CPT | Performed by: NURSE PRACTITIONER

## 2025-06-11 PROCEDURE — G0463 HOSPITAL OUTPT CLINIC VISIT: HCPCS

## 2025-06-11 RX ORDER — SPIRONOLACTONE 25 MG/1
12.5 TABLET ORAL DAILY
Qty: 45 TABLET | Refills: 2 | Status: SHIPPED | OUTPATIENT
Start: 2025-06-16

## 2025-06-11 RX ORDER — METOPROLOL SUCCINATE 25 MG/1
12.5 TABLET, EXTENDED RELEASE ORAL EVERY 12 HOURS SCHEDULED
Qty: 90 TABLET | Refills: 2 | Status: SHIPPED | OUTPATIENT
Start: 2025-06-11

## 2025-06-11 NOTE — TELEPHONE ENCOUNTER
Interested in cardiac rehab. Will have to wait 6 weeks post discharge to enroll. Does not currently have insurance. Will have coverage 7/11 with new employer. Will call patient after this date to get ID # and info and verify benefits.

## 2025-06-11 NOTE — LETTER
"June 11, 2025     Davey Oliveira MD  [address] [facility] [place] [address]    Patient: Twan Santos   YOB: 1990   Date of Visit: 6/11/2025     Dear Davey Oliveira MD:       The hospitalist have referred to me a Twan Santos for evaluation. Below are the relevant portions of my assessment and plan of care.    If you have questions, please do not hesitate to call me. I look forward to following Twan along with you.         Sincerely,        OWEN Bravo        CC: STUART Khan MD Jonathan L Grief, MD Ritter, Paul T, APRN  06/11/25 1731  Signed    [facility]  P. T. \"Mack\" OWEN Sánchez, [facility]          Twan Santos is a 35 y.o.  History of Present Illness  35 y.o. male presented with no significant past medical history with a past medical history of notedly diagnosed nonischemic dilated cardiomyopathy (LVIDD at 6 cm] (TTE 6/3/25) EF 19%, grade 2 diastolic dysfunction, valvular heart disease with severe MR, recent DAYAMI with stage II underlying CKD, sinus tachycardia with heart rate up in the 120s while hospitalized recently, recent bilateral pleural effusions low testosterone level, metabolic syndrome with hypertension, elevated BMI at 34.4 kg/m² as well as prediabetes VS T2DM.presented to the [facility] 3 June 2025 with a 1 month history of shortness of breath. Patient reports that initially he started with some dyspnea back in March 2025. He thought it was allergy related.  Had an elevated proBNP listed at 807 after diuresis he comes back for his initial heart failure visit.  [facility] on 11 June 2025 since recent hospitalization patient reports he has done fairly well has been trying very much to limit his salt however he has taken considerable amount of Mrs. Dash.            Subjective        Review of Systems -  Constitutional: + weakness, + fatigue, persist but not at a significant level no fever, rigors, chills "   Eyes: No recent vision changes, eye pain   ENT/oropharynx: No recent difficulty swallowing, sore throat, epistaxis, changes in hearing   Cardiovascular: No recent chest pain, chest tightness, palpitations except as noted in HPI, paroxysmal nocturnal dyspnea, orthopnea, diaphoresis, dizziness & no pre or piedad syncopal episodes   Respiratory: No at rest shortness of breath, only minimal dyspnea on exertion, as he can walk much greater than 2 blocks without getting dyspneic no significant productive cough, no wheezing and no hemoptysis   Gastrointestinal: No abdominal pain, nausea, vomiting, diarrhea, bloody stools   Genitourinary: No hematuria, dysuria other than increased frequency   Neurological: No headache, tremors, numbness,  or hemiparesis    Musculoskeletal: No cramps, myalgias,  joint pain, joint swelling   Integument: No recent rash, no edema         Patient Active Problem List   Diagnosis   • Dyspnea   • Propensity for sinus tachycardia   • Elevated blood pressure reading   • Acute kidney failure   • Obesity (BMI 30-39.9)   • Nonischemic dilated cardiomyopathy   • Valvular heart disease   • CKD (chronic kidney disease) stage 2, GFR 60-89 ml/min   • Prediabetes vs T2DM   • Nonobstructive CAD   • Metabolic syndrome X   • mild Hyperkalemia   • DAYAMI (acute kidney injury)     family history is not on file.   reports that he has never smoked. He has never used smokeless tobacco. He reports that he does not currently use alcohol. He reports that he does not use drugs.  No Known Allergies  Physical Activity: Inactive (6/3/2025)    Exercise Vital Sign    • Days of Exercise per Week: 0 days    • Minutes of Exercise per Session: 0 min          Current Outpatient Medications:   •  metoprolol succinate XL (TOPROL-XL) 25 MG 24 hr tablet, Take 0.5 tablets by mouth Every 12 (Twelve) Hours. Hold for systolic blood pressure <95 and/or a heart < 60, Disp: 90 tablet, Rfl: 2  •  sacubitril-valsartan (ENTRESTO) 24-26 MG tablet,  Take 1 tablet by mouth Every 12 (Twelve) Hours., Disp: 60 tablet, Rfl: 1  •  [START ON 6/16/2025] spironolactone (ALDACTONE) 25 MG tablet, Take 0.5 tablets by mouth Daily., Disp: 45 tablet, Rfl: 2  •  Testosterone Cypionate 200 MG/ML solution, Inject 1.5 mL into the appropriate muscle as directed by prescriber Every 7 (Seven) Days., Disp: , Rfl:     Objective   Vital Sign Review:   Vitals:    06/11/25 1410   BP: 95/66   Pulse: 100   Resp: 18   SpO2: 97%     Body mass index is 32.58 kg/m².      06/11/25  1410   Weight: 106 kg (233 lb 9.6 oz)       Physical Exam:    General:  Well-developed, slightly overly well-nourished, cooperative, no distress, appears stated age    Neuro:  A&O x3. No significantly obvious focal neuro deficet    Psych:  Pleasant - mildly flattened affect    HENT:  Normocephalic, atraumatic, moist mucous membranes , bearded, normal ear placement,Throat not injected   Eyes:  PERRLA, Conjunctivae not injected, EOM's intact, without any arcus senilis, conjunctiva does not appear significantly injected   Neck:  Supple, very mildly thickened, no lymphadenopathy nor thyromegaly no JVD or bruit    Lungs:    Symmetrical rise & fall of chest with baseline respiratory pattern. To auscultation lungs are Clear bilaterally, no active wheezes, no rhonchi or rales are noted bases of the lung   Chest wall:  Currently he is wearing LifeVest no significant tenderness when palpated. PMI @ 6th ICS MAL   Heart:  Rate mid 90s and regular rhythm, S1 and S2 normal, no S3 or S4, Gr II-III/VI systolic ejection murmur best heard at the apex , no obvious rub, click or gallop.    Abdomen:  non-distended, non-tender, bowel sounds noted in the 4 quadrants of the abdomen, w/o significant central abdominal adipose tissue identified    Extremities:  Equal color motion temperature and sensitivity, Rapid capillary refill noted within the nailbeds of fingers and toes bilaterally no identifiable lower extremity edema.    Pulses:  2+  and symmetric all extremities, rapid capillary refill    Skin:  No obvious rashes, significant lesions identified, warm dry and of normal turgor         DATA REVIEWED:   EKG:      ---------------------------------------------------  ECHO:    Results for orders placed during the hospital encounter of 25    Adult Transthoracic Echo Complete W/ Cont if Necessary Per Protocol    Interpretation Summary  •  Left ventricular systolic function is severely decreased. Calculated left ventricular EF = 19.1%  •  The left ventricular cavity is moderate to severely dilated.  •  Left ventricular diastolic function is consistent with (grade III w/high LAP) fixed restrictive pattern.  Average GLS -2.8%.  •  Moderately reduced right ventricular systolic function noted.  •  The right ventricular cavity is dilated.  •  Left atrial volume is moderately increased.  •  Abnormal mitral valve structure consistent with dilated annulus.  •  Severe mitral valve regurgitation is present.  •  Estimated right ventricular systolic pressure from tricuspid regurgitation is mildly elevated (35-45 mmHg).          -----------------------------------------------------  RHC/LHC    Results for orders placed during the hospital encounter of 25    Cardiac Catheterization/Vascular Study    Conclusion  Table formatting from the original result was not included.  2025      Heart Cath Report    NAME:              Twan Santos  :                1990  AGE/SEX:        35 y.o. male  MRN:                [id number]        Procedures Performed    Left heart catheterization  Selective coronary angiography  Left ventriculography  Mynx closure device    :   Davey Oliveira MD    Vascular Access Site: Femoral    Indication for procedure: Shortness of breath, acute HFrEF, elevated troponin, severe MR, cardiomyopathy with EF less than 20%      Procedure Note    After discussing the risks, benefits, and alternatives of the  procedure, informed consent was obtained.  Timeout was done before the procedure.  Moderate conscious sedation was given utilizing IV Versed and fentanyl administered by RN with continuous EKG oximetry and hemodynamic monitoring supervised by me throughout the entire case.  I was present with the patient for the duration of moderate sedation and supervised staff who had no other duties and monitored the patient for the entire procedure patient had Silveira 2-3 sedation scale. the vascular access site was prepped and draped in the usual sterile fashion.  2% lidocaine was used for local anesthesia. Appropriate landmarks were assessed.  A 6 Chinese short sheath was inserted in the artery using the modified Seldinger technique.    Selective coronary angiography was performed with JL4 and JR4 diagnostic catheters. Left ventriculogram  was performed with an angled pigtail catheter.  All exchanges were performed over the wire.  No specimens were removed.  There were no apparent acute or early complications.  The patient tolerated the procedure well and was transferred to the recovery area in stable condition.    Closure device: Mynx device was deployed successfully after right iliofemoral angiogram was performed. Good hemostasis was achieved.    Complications:  None  Blood Loss: minimal    Hemodynamics    Pressures    Ao:    128/80 mmHg  LV:    124/24 mmHg  End-diastolic pressure:  20--24  mmHg  No significant aortic valvular gradient on pullback    Coronary Angiography    Left Main :  The left main   large vessel without disease    Left Anterior Descending : Good caliber vessel gives rise to diagonals and septals without disease    Left Circumflex : Gives rise to large principal marginal continues in the AV groove gives rise to 2 more branches without disease    Right Coronary Artery :  The right coronary artery   Is dominant artery without disease    Dominance:  []  Left  [x]  Right  []  Co-Dominant      Left  Ventriculography:    Estimated Ejection Fraction: 15 %  Wall motion abnormalities: LV with severe global left ventricular hypokinesis  Mitral Regurgitation: 2-3+ MR    Impression:    Nonobstructive CAD  LV with severe LV dysfunction  Elevated LVEDP  Mitral regurgitation    Recommendations:    Medical management risk factor modification  Outpatient follow-up with structural heart to evaluate and treat MR        I sincerely appreciate the opportunity to participate in your patient's care. Please feel free to contact me anytime if I can be of assistance in this or any other way.      Pertinent History  History reviewed. No pertinent past medical history.  History reviewed. No pertinent surgical history.  Prior to Admission medications  Medication Sig Start Date End Date Taking? Authorizing Provider  albuterol sulfate  (90 Base) MCG/ACT inhaler Inhale 1 puff Every 4 (Four) to 6 (Six) Hours As Needed for Wheezing or Shortness of Air.   Yes Izabel Rios MD  dextromethorphan polistirex ER (DELSYM) 30 MG/5ML Suspension Extended Release oral suspension Take 10 mL by mouth 2 (Two) Times a Day As Needed (cough).   Yes Izabel Rios MD  glipizide (GLUCOTROL XL) 5 MG ER tablet Take 1 tablet by mouth Daily.   Yes Izabel Rios MD  metFORMIN (GLUCOPHAGE) 500 MG tablet Take 2 tablets by mouth 2 (Two) Times a Day With Meals.   Yes Izabel Rios MD  oxyCODONE (ROXICODONE) 10 MG tablet Take 0.5-1 tablets by mouth 3 (Three) Times a Day As Needed for Moderate Pain or Severe Pain.   Yes Izabel Rios MD  Testosterone Cypionate 200 MG/ML solution Inject 1.5 mL into the appropriate muscle as directed by prescriber Every 7 (Seven) Days.   Yes Izabel Rios MD      Pre-Procedure Notes  H&P Performed  [x]  Yes []  No       []  N/A    Indications:  []  ACS <= 24 HRS  []  ACS >24 HRS  []  New Onset Angina <= 2 mos  []  Worsening Angina  []  Resuscitated Cardiac Arrest  []  Angina on  Exertion:  []  Suspected CAD  []  Valvular Disease  []  Pericardial Disease  []  Cardiac Arrythmia  []  Cardiomyopathy  []  LV Dysfunction  []  Syncope  []  Post Cardiac Transplant  []  Eval. For Exercise Clearance  []  Other  []  Pre-Operative Evaluation  If Pre-Op Eval:  Evaluation for Surgery Type:  []  Cardiac Surgery   []  Non-Cardiac Surgery  Functional Capacity:  []  <4 METS  []  >=4 METS w/o symptoms  []  >= 4 METS with symptoms  []  Unknown  Surgical Risk:  []  Low  []  Intermediate  []  High Risk: Vascular  []  High Risk Non-Vascular    Risks, Benefits, & Complications Discussed:  [x]  Yes  []  No  []  N/A    Questions Answered:  [x]  Yes  []  No  []  N/A    Consent Obtained:  [x]  Yes  []  No  []  N/A    CHF: [x]  Yes  []  No  If Yes:  Newly Diagnosed?  [x]  Yes  []  No  If Yes:  HF Type:  []  Diastolic  [x]  Systolic  []  Unknown      Davey Oliveira MD  6/4/2025  14:35 EDT  Electronically signed by Davey Oliveira MD, 06/04/25, 2:35 PM EDT.      ---------------------------------------------------------------------------  CXR/Imaging:     Imaging Results (Most Recent)       None                -----------------------------------------------------------------------------  CT:   US Renal Bilateral  Result Date: 6/3/2025  1.No hydronephrosis or renal calculi identified. Electronically Signed: Richy Leung MD  6/3/2025 10:29 AM EDT  Workstation ID: [id number]    CT Angiogram Chest Pulmonary Embolism  Result Date: 6/3/2025  Impression: 1.No evidence of pulmonary embolus. 2.Widespread groundglass attenuation of the lungs without lobar consolidation. Few scattered areas of more dense airspace disease most pronounced in the lingula. Findings are most suggestive of infectious process in particular viral or atypical pathogen.  Noninfectious pulmonary edema is a consideration as well. Correlate with symptoms. 3.Small bilateral pleural effusions. 4.Benign 2.4 cm left adrenal adenoma.  Electronically Signed: Kyle Gongora MD  6/3/2025 7:55 AM EDT  Workstation ID: [id number]          --------------------------------------------------------------------------------------------------------------    Laboratory evaluations:    Hospital Outpatient Visit on 06/11/2025   Component Date Value   • QT Interval 06/11/2025 354    • QTC Interval 06/11/2025 431    Hospital Outpatient Visit on 06/11/2025   Component Date Value   • proBNP 06/11/2025 160.0    • Glucose 06/11/2025 237 (H)    • BUN 06/11/2025 28.5 (H)    • Creatinine 06/11/2025 1.59 (H)    • Sodium 06/11/2025 131 (L)    • Potassium 06/11/2025 5.4 (H)    • Chloride 06/11/2025 95 (L)    • CO2 06/11/2025 25.7    • Calcium 06/11/2025 9.0    • BUN/Creatinine Ratio 06/11/2025 17.9    • Anion Gap 06/11/2025 10.3    • eGFR 06/11/2025 57.3 (L)    No results displayed because visit has over 200 results.          Result Review:  I have personally reviewed the results from the time of this admission to 6/11/2025 16:05 EDT and agree with these findings:  [x]  Laboratory list / accordion  []  Microbiology  []  Radiology  [x]  EKG/Telemetry   []  Cardiology/Vascular   []  Pathology  []  Old records  []  Other:  Most notable findings include: Reduced eGFR from 83 to 57 today proBNP is significantly reduced from 8078 days ago to states today unfortunately glucose was significantly elevated at 237 as well as an elevation noted in her potassium up to 5.4 FYI the creatinine is now 1.6           Diagnoses and all orders for this visit:    1. Nonischemic dilated cardiomyopathy (Primary)  Overview:  A.  LVIDd 6 cm  B.  Chronic combined systolic and diastolic heart failure (HFrEF)   i (TTE 6/3/25) EF 19% grade 3 diastolic dysfunction  C.  LifeVest    Orders:  -     proBNP; Standing  -     proBNP; Standing  -     proBNP    2. Valvular heart disease  Overview:  A.  Severe mitral regurgitation      3. Nonobstructive CAD  Overview:  A. S/p [facility] 6/4/25   Nonobstructive CAD  LV with  severe LV dysfunction  Elevated LVEDP  Mitral regurgitation  Reduced LVEF       4. RANDHAWA (dyspnea on exertion)  -     proBNP; Standing  -     Basic Metabolic Panel; Standing  -     ECG 12 Lead; Future  -     proBNP; Standing  -     proBNP  -     Basic Metabolic Panel; Standing  -     Basic Metabolic Panel    5. mild Hyperkalemia    Other orders  -     spironolactone (ALDACTONE) 25 MG tablet; Take 0.5 tablets by mouth Daily.  Dispense: 45 tablet; Refill: 2  -     metoprolol succinate XL (TOPROL-XL) 25 MG 24 hr tablet; Take 0.5 tablets by mouth Every 12 (Twelve) Hours. Hold for systolic blood pressure <95 and/or a heart < 60  Dispense: 90 tablet; Refill: 2        NYHA STAGE C; FC-II.  Clinical status was assessed and has improved.       Today, Patient appears euvolemic. and with  Excellent perfusion. The patient's hemodynamics are currently acceptable. HR is mildly: tachycardic and  BP/MAP was reviewed and there is not room for medication up-titration.    The patient's clinical course has been impacted by Worsening Renal Failure.  As well as hypotension    GDMT Assessment: The patient is currently on his initial goal-directed medical therapy.  With hopes of being able to uptitrate drugs as available  We do not have room to optimize their medications based on HD assessment today, GFR, and electrolytes.     GDMT changes recommended today: Decrease beta-blocker.      BB:   decrease Metoprolol Succinate 12.5mg bid  Monitor heart rate. & Blood pressure Please call the [facility] for HR<60, dizziness, lightheadedness, syncope    A/A/A:     continue Entresto 24/26mg BID  Occasional monitoring of Chem-7 is recommended; call for the development of a new cough or S/Sx angioedema    AUN4D-W:  Will consider starting once he gets insurance next visit  Call for S/Sx genital mycotic infections  Do not take when in adequate oral intake, NPO, GI illness    MRA:  Asked to hold until 16 June 2025 then plan on decrease Spironolactone 12.5mg daily  started on 16 June 2025  Initiation instructions: Obtain a CHEM7 after 4-5 doses.  We will call with the results. RTC in 1 week for hemodynamic assessment and repeat laboratory monitoring.  Recommended quarterly assessment of GFR and electrolytes for the first 12 months  After the patient has been on MRA therapy for 12 months without high-risk features, recommended q 6 monthly BMPs and assessment, per guidelines.   Avoid potassium supplementation  \The patient has not required potassium supplementation to maintain adequate serum levels.    Avoid salt substitutes containing potassium  Please hold this medication if diarrhea, vomiting, or infection with fever and sweating occurs      Diuretic Regimen:    Off diuretics due to: Euvolemic     Labs/Diagnostics/Referrals:    Labs - Did a proBNP and a BMP today   Imaging -No orders placed today   Referrals No referrals placed today however would consider renal colic   Currently Pending: Just the follow-up lab work 1 week       HF-Specific Device Therapy/AHF    ICD/CRT-D  ICD Qualification  Repeat echocardiogram due in late October or early September      [] GDMT X 3 months  [x] EF < 35%  [] QRS Duration @ 105  [] > 150  [] LBBB  [] Atrial Fibrillation  [x] NYHA Class IIc  [x] Life expectancy > 1 year   -Indicated.  But awaiting 90 days  -Indications: Secondary Prevention of Sudden Cardiac Death  -Deferred due to: ,<90-day window     TMVR  -Indicated.   -Deferred due to: Primary cardiologist cardiologist for consideration with establishment with the valvular heart team as he might well benefit from a [employer] [facility]_EF<35%_CHOICES:[id number]}   CardioMEMS Not available at [facility]     Discussion     On initial visit to the heart failure clinic he had already been started on several failure core measures including beta-blocker, ARNI,  and MRA inhibitor  Seem to be knowledge overloaded on initial visit was accompanied by his wife  Seems to be sticking to majority of  parameters  Actually cut his spironolactone due to hyperkalemia down to 12.5 daily  Decreased due to hypotension with parameters his Toprol-XL to 12.5 every 12 H  Getting labs in 1 week  Plan on seeing again in 2 weeks  Discussed food and fluid restrictions  Will obviously need follow-up echocardiogram at 90 days to remove his LifeVest if his EF is greater than 35 if not then will need to proceed with ICD consideration    I did the following activities preparing for the visit with Chris Rodríguez  including: reviewing tests, once pt arrived in clinic I also performed a medically appropriate examination and/or evaluation, I personally spent considerable time counseling and educating the patient/family/caregiver, ordering medications, tests, or procedures, referring and communicating with other health care professionals, and documenting information in the medical record. I estimate including preparation 45 minutes          Lifestyle Advice: A hand-out has been provided to the patient.   - call office if I gain more than 3 pounds in one day or 5 pounds in one week   - keep legs up while sitting   - use salt in moderation recommended 2 gms, or 2000 mg daily max   - watch for swelling in feet, ankles and legs every day   - weigh daily   -call for concerning S/Sx   -- activity or exercise based on tolerance encouraged     CODE STATUS: FULL

## 2025-06-11 NOTE — PROGRESS NOTES
[facility] HEART FAILURE CLINIC - PHARMACY SERVICE    Patient Name: Twan Santos  :1990  Age: 35 y.o.  Sex: male  Primary Cardiologist: Roxanne/OWEN Rosenberg  Nephrology: N/A  PCP: Grief     HFrEF with EF 19% (Last Echo: 6/3/25).    NYHA Class II C     ECHO:    Results for orders placed during the hospital encounter of 25    Adult Transthoracic Echo Complete W/ Cont if Necessary Per Protocol    Interpretation Summary    Left ventricular systolic function is severely decreased. Calculated left ventricular EF = 19.1%    The left ventricular cavity is moderate to severely dilated.    Left ventricular diastolic function is consistent with (grade III w/high LAP) fixed restrictive pattern.  Average GLS -2.8%.    Moderately reduced right ventricular systolic function noted.    The right ventricular cavity is dilated.    Left atrial volume is moderately increased.    Abnormal mitral valve structure consistent with dilated annulus.    Severe mitral valve regurgitation is present.    Estimated right ventricular systolic pressure from tricuspid regurgitation is mildly elevated (35-45 mmHg).      The patient's last EKG was reviewed from 25 and shows a QTcB of 431 ms.     ICD: no - wearing LifeVest    CKD: N/A    BMP          2025    04:00 2025    00:13 2025    13:44 2025    13:46   [facility]   BUN 11.5  15.5   28.5    Creatinine 1.26  1.16   1.59    Sodium 138  138   131    Potassium 3.9  3.6  4.9  5.4    Chloride 100  100   95    CO2 24.7  21.9   25.7    Calcium 9.1  9.2   9.0        Lab Results   Component Value Date    EGFR 57.3 (L) 2025    EGFR 83.7 2025    EGFR 75.8 2025       Lab Results   Component Value Date    PROBNP 160.0 2025    PROBNP 807.0 (H) 2025       Recent Vitals         2025       BP: 107/74 109/75 95/66     Pulse: 103 106 100     Temp: 98 °F (36.7 °C) 97.5 °F (36.4 °C) --     Weight: -- -- 106 kg (233 lb 9.6 oz)     BMI  (Calculated): -- -- 32.6              -CHF-specific BB:      Pre Visit Dose: Metoprolol succinate XL 25 mg PO BID    Post Visit Dose: Metoprolol succinate XL 12.5 mg PO BID ( bpm, BP 95/66 mmHg)     - Target Dose: Metoprolol succinate  mg PO daily.     - Goal HR of 50s to 60s.     - Patient should be seen every 10 to 14 days for a pulse check with plans for up-titration until target heart rate is achieved.        -ACE/ARB/ARNI:     Pre Visit Dose: Sacubitril/valsartan 24/26 mg BID    Post Visit Dose: Sacubitril/valsartan 24/26 mg BID (BP 95/66 mmHg, SCr 1.59 mg/dL)     - Target Dose: Sacubitril/valsartan 97/103 mg PO BID    - Patient should have a follow-up appointment every 2 to 4 weeks for a hemodynamic check with possible up-titration to target dose.         -SGLT2 inhibitor therapy:    Pre Visit Dose: None    Post Visit Dose: None - will not initiate currently due to DAYAMI (SCr increased from 1.16 to 1.59 mg/dL in 6 days)    - Target Dose: N/A    -DIURETIC:     Pre Visit Dose: None    Post Visit Dose: None     -MRA:     Pre Visit Dose: Spironolactone 25 mg daily    Post Visit Dose: Spironolactone 12.5 mg daily - hold dose 6/12-6/15 then initiate at lower dose on 6/16    - Target Dose: Spironolactone 25-50 mg PO daily    - K is not < 5 mEq/L and SCr is </= 2.5 mg/dL in male and eGFR > 30 mL/min/1.73m3    Lab Results   Component Value Date    K 5.4 (H) 06/11/2025       Lab Results   Component Value Date    CREATININE 1.59 (H) 06/11/2025         -MAGNESIUM:     Mag level not obtained today.    Lab Results   Component Value Date    MG 2.0 06/03/2025       Pre Visit Dose: None    Post Visit Dose: None      -ANTICOAGULATION:     None    -OTHER CV MEDS:     Pre Visit Dose: None    Post Visit Dose: None    -Clinic Administered Medications:     None         Patient Assistance:      [employer] patient assistance application previously completed and faxed by case management while patient was admitted. Contacted  [employer] to confirm this has been received. Application was received but proof of income still required. Patient provided most recent tax return and this was sent via fax today.    [employer] patient assistance application completed and faxed today.         PLAN:  Initiation/Discontinuation/Dose Adjustment:   A. Decrease metoprolol succinate to 12.5 mg PO BID with holding parameters  B. Hold spironolactone 6/12-6/15 (already took dose today) then initiate at lower dose of 12.5 mg PO daily on 6/16 (due to hyperkalemia)  Education provided: RN discussed medication changes with patient and confirmed patient has a tablet splitter at home  Coordination of Care: See patient assistance section  Refills: Updated prescriptions for metoprolol succinate and spironolactone sent to patient's retail pharmacy  Follow-up: Repeat labs in one week to reassess SCr and K levels; follow-up with HF Clinic in two weeks      Thank you for allowing me to participate in the care of your patient.    Janel Fox, Kat  [facility] Heart Failure Clinic  06/11/25  16:10 EDT

## 2025-06-12 ENCOUNTER — TELEPHONE (OUTPATIENT)
Facility: HOSPITAL | Age: 35
End: 2025-06-12

## 2025-06-12 NOTE — TELEPHONE ENCOUNTER
Patient approved for [employer] patient assistance program through 6/11/26 (see media tab). Contacted [employer] to check on status of application but it has not been processed yet.    Contacted patient to inform him of [employer] approval. Patient requested instructions for ordering fill be sent to him via email. Will print PDF from media tab and email to patient with instructions.    Janel Fox PharmD, BCPS, BCACP  06/12/25 10:44 EDT

## 2025-06-18 ENCOUNTER — LAB (OUTPATIENT)
Dept: LAB | Facility: HOSPITAL | Age: 35
End: 2025-06-18
Payer: MEDICAID

## 2025-06-18 DIAGNOSIS — I42.0 NONISCHEMIC DILATED CARDIOMYOPATHY: ICD-10-CM

## 2025-06-18 DIAGNOSIS — R06.09 DOE (DYSPNEA ON EXERTION): ICD-10-CM

## 2025-06-18 LAB
ANION GAP SERPL CALCULATED.3IONS-SCNC: 10.6 MMOL/L (ref 5–15)
BUN SERPL-MCNC: 27.7 MG/DL (ref 6–20)
BUN/CREAT SERPL: 17.2 (ref 7–25)
CALCIUM SPEC-SCNC: 9.2 MG/DL (ref 8.6–10.5)
CHLORIDE SERPL-SCNC: 98 MMOL/L (ref 98–107)
CO2 SERPL-SCNC: 25.4 MMOL/L (ref 22–29)
CREAT SERPL-MCNC: 1.61 MG/DL (ref 0.76–1.27)
EGFRCR SERPLBLD CKD-EPI 2021: 56.5 ML/MIN/1.73
GLUCOSE SERPL-MCNC: 337 MG/DL (ref 65–99)
NT-PROBNP SERPL-MCNC: 134 PG/ML (ref 0–450)
POTASSIUM SERPL-SCNC: 5.2 MMOL/L (ref 3.5–5.2)
SODIUM SERPL-SCNC: 134 MMOL/L (ref 136–145)

## 2025-06-18 PROCEDURE — 80048 BASIC METABOLIC PNL TOTAL CA: CPT

## 2025-06-18 PROCEDURE — 83880 ASSAY OF NATRIURETIC PEPTIDE: CPT

## 2025-06-19 ENCOUNTER — TELEPHONE (OUTPATIENT)
Facility: HOSPITAL | Age: 35
End: 2025-06-19
Payer: MEDICAID

## 2025-06-19 ENCOUNTER — READMISSION MANAGEMENT (OUTPATIENT)
Dept: CALL CENTER | Facility: HOSPITAL | Age: 35
End: 2025-06-19
Payer: MEDICAID

## 2025-06-19 DIAGNOSIS — I42.0 NONISCHEMIC DILATED CARDIOMYOPATHY: ICD-10-CM

## 2025-06-19 DIAGNOSIS — N17.9 AKI (ACUTE KIDNEY INJURY): ICD-10-CM

## 2025-06-19 DIAGNOSIS — N18.2 CKD (CHRONIC KIDNEY DISEASE) STAGE 2, GFR 60-89 ML/MIN: Primary | Chronic | ICD-10-CM

## 2025-06-19 NOTE — TELEPHONE ENCOUNTER
Heart Failure Clinic: [facility]  Clinical Outreach     Twan Santos is a 35 y.o. male and is a patient of the [facility] heart failure clinic.   Patient had follow up labs done.   Reviewed labs with provider.     New orders to hold sprinolactone.  New orders for nephrology consult which was called and faxed to [facility].  Notified patient of possible appointment 6/26/25. Nephrologist office will call patient with appointment details.     Discussed patient follow up appointment at heart failure clinic 6/24/25.     Discussed medication changes, patient verbalized understanding.        Simona Deleon RN  [facility] Heart Failure Clinic  6/19/2025  09:02 EDT

## 2025-06-19 NOTE — OUTREACH NOTE
CHF Week 2 Survey      Flowsheet Row Responses   [facility] facility patient discharged from? [facility]   Does the patient have one of the following disease processes/diagnoses(primary or secondary)? CHF   Week 2 attempt successful? No   Unsuccessful attempts Attempt 1            Tomasz CORBIN - Registered Nurse

## 2025-06-23 NOTE — PROGRESS NOTES
"  [facility]  P. T. \"Mack\" OWEN Sánchez, RCS      06/24/2025     Twan Santos is a 35 y.o.    History of Present Illness  35 y.o. male who is now followed by Dr. Oliveira presented to [facility] with no significant past medical history with a past medical history of notedly diagnosed nonischemic dilated cardiomyopathy (LVIDD at 6 cm] (TTE 6/3/25) EF 19%, grade 2 diastolic dysfunction, valvular heart disease with severe MR, recent DAYAMI with stage II underlying CKD, sinus tachycardia with heart rate up in the 120s while hospitalized recently, recent bilateral pleural effusions low testosterone level, metabolic syndrome with hypertension, elevated BMI at 34.4 kg/m² as well as prediabetes VS T2DM.presented to the [facility] ED 3 Vidhi 2025 with a 1 month history of shortness of breath. Patient reports that initially he started with some dyspnea back in March 2025. He thought it was allergy related.  Had an elevated proBNP listed at 807 after diuresis he comes back for his initial heart failure visit.  [facility] on 11 June 2025 since recent hospitalization patient reports he has done fairly well has been trying very much to limit his salt however he has taken considerable amount of Mrs. Dash.  Comes back for his next visit here on 6/24/2025 since last seen he has been adhering to his dietary restrictions and done very well brought his blood pressure logs and which do show some hypotension unfortunately also show me that he is mildly tachycardic he has not had any volume issues whatsoever since last seen                               Subjective        Review of Systems -    Constitutional: + weakness, + fatigue, persist but are improving no fever, rigors, chills   Eyes: No recent vision changes, eye pain   ENT/oropharynx: No recent difficulty swallowing, sore throat, epistaxis, changes in hearing   Cardiovascular: No recent chest pain, chest tightness, palpitations except as noted in " HPI, paroxysmal nocturnal dyspnea, orthopnea, diaphoresis, dizziness & no pre or piedad syncopal episodes   Respiratory: No at rest shortness of breath, only minimal dyspnea on exertion, as he can walk much >2 blocks without getting dyspneic, he has not had any significant productive cough, no wheezing and no hemoptysis   Gastrointestinal: No abdominal pain, nausea, vomiting, diarrhea, bloody stools   Genitourinary: No hematuria, dysuria other than increased frequency   Neurological: No headache, tremors, numbness,  or hemiparesis    Musculoskeletal: No cramps, myalgias,  joint pain, joint swelling   Integument: No recent rash, no lower extremity edema         Patient Active Problem List   Diagnosis    Dyspnea    Propensity for sinus tachycardia    Elevated blood pressure reading    Acute kidney failure    Obesity (BMI 30-39.9)    Nonischemic dilated cardiomyopathy    Valvular heart disease    CKD (chronic kidney disease) stage 2, GFR 60-89 ml/min    Prediabetes vs T2DM    Nonobstructive CAD    Metabolic syndrome X    mild Hyperkalemia    DAYAMI (acute kidney injury)     family history is not on file.   reports that he has never smoked. He has never used smokeless tobacco. He reports that he does not currently use alcohol. He reports that he does not use drugs.  No Known Allergies  Physical Activity: Inactive (6/3/2025)    Exercise Vital Sign     Days of Exercise per Week: 0 days     Minutes of Exercise per Session: 0 min          Current Outpatient Medications:     metoprolol succinate XL (TOPROL-XL) 25 MG 24 hr tablet, Take 1 tablet by mouth Every 12 (Twelve) Hours. Hold for systolic blood pressure <95 and/or a heart < 60, Disp: 180 tablet, Rfl: 2    sacubitril-valsartan (ENTRESTO) 24-26 MG tablet, Take 0.5 tablets by mouth Every 12 (Twelve) Hours., Disp: 60 tablet, Rfl: 1    Testosterone Cypionate 200 MG/ML solution, Inject 1.5 mL into the appropriate muscle as directed by prescriber Every 7 (Seven) Days., Disp: ,  Rfl:     Objective   Vital Sign Review:   Vitals:    06/24/25 1528   BP: 107/78   Pulse: 104   Resp: 16   SpO2: 98%     Body mass index is 31.02 kg/m².      06/24/25  1528   Weight: 101 kg (222 lb 6.4 oz)       Physical Exam:    General:  Well-developed, slightly overly well-nourished, cooperative, no distress, appears stated age    Neuro:  A&O x3. No significantly obvious focal neuro deficet    Psych:  Pleasant - mildly flattened affect    HENT:  Normocephalic, atraumatic, moist mucous membranes , bearded, normal ear placement,Throat not injected   Eyes:  PERRLA, Conjunctivae not injected, EOM's intact, without any arcus senilis, conjunctiva does not appear significantly injected   Neck:  Supple, very mildly thickened, no lymphadenopathy nor thyromegaly no JVD or bruit    Lungs:    Symmetrical rise & fall of chest with baseline respiratory pattern. To auscultation lungs are Clear bilaterally, no wheeze, no rhonchi or rales are noted bases of the lung aerated   Chest wall:  Currently he is wearing LifeVest no significant tenderness when palpated. PMI @ 6th ICS MAL   Heart:  Rate mid 90s and regular rhythm, S1 and S2 normal, no S3 or S4, Gr II-III/VI systolic ejection murmur best heard at the apex , no obvious rub, click or gallop.    Abdomen:  non-distended, non-tender, bowel sounds noted in the 4 quadrants of the abdomen, w/o significant central abdominal adipose tissue identified    Extremities:  Equal color motion temperature and sensitivity, Rapid capillary refill noted within the nailbeds of fingers and toes bilaterally no identifiable lower extremity edema.    Pulses:  2+ and symmetric all extremities, rapid capillary refill    Skin:  No obvious rashes, significant lesions identified, warm dry and of normal turgor         DATA REVIEWED:   EKG:      ---------------------------------------------------  ECHO:    Results for orders placed during the hospital encounter of 06/03/25    Adult Transthoracic Echo  Complete W/ Cont if Necessary Per Protocol    Interpretation Summary    Left ventricular systolic function is severely decreased. Calculated left ventricular EF = 19.1%    The left ventricular cavity is moderate to severely dilated.    Left ventricular diastolic function is consistent with (grade III w/high LAP) fixed restrictive pattern.  Average GLS -2.8%.    Moderately reduced right ventricular systolic function noted.    The right ventricular cavity is dilated.    Left atrial volume is moderately increased.    Abnormal mitral valve structure consistent with dilated annulus.    Severe mitral valve regurgitation is present.    Estimated right ventricular systolic pressure from tricuspid regurgitation is mildly elevated (35-45 mmHg).          -----------------------------------------------------  RHC/LHC    Results for orders placed during the hospital encounter of 25    Cardiac Catheterization/Vascular Study    Conclusion  Table formatting from the original result was not included.  2025      Heart Cath Report    NAME:              Twan Santos  :                1990  AGE/SEX:        35 y.o. male  MRN:                [id number]        Procedures Performed    Left heart catheterization  Selective coronary angiography  Left ventriculography  Mynx closure device    :   Davey Oliveira MD    Vascular Access Site: Femoral    Indication for procedure: Shortness of breath, acute HFrEF, elevated troponin, severe MR, cardiomyopathy with EF less than 20%      Procedure Note    After discussing the risks, benefits, and alternatives of the procedure, informed consent was obtained.  Timeout was done before the procedure.  Moderate conscious sedation was given utilizing IV Versed and fentanyl administered by RN with continuous EKG oximetry and hemodynamic monitoring supervised by me throughout the entire case.  I was present with the patient for the duration of moderate sedation and  supervised staff who had no other duties and monitored the patient for the entire procedure patient had Silveira 2-3 sedation scale. the vascular access site was prepped and draped in the usual sterile fashion.  2% lidocaine was used for local anesthesia. Appropriate landmarks were assessed.  A 6 Macedonian short sheath was inserted in the artery using the modified Seldinger technique.    Selective coronary angiography was performed with JL4 and JR4 diagnostic catheters. Left ventriculogram  was performed with an angled pigtail catheter.  All exchanges were performed over the wire.  No specimens were removed.  There were no apparent acute or early complications.  The patient tolerated the procedure well and was transferred to the recovery area in stable condition.    Closure device: Mynx device was deployed successfully after right iliofemoral angiogram was performed. Good hemostasis was achieved.    Complications:  None  Blood Loss: minimal    Hemodynamics    Pressures    Ao:    128/80 mmHg  LV:    124/24 mmHg  End-diastolic pressure:  20--24  mmHg  No significant aortic valvular gradient on pullback    Coronary Angiography    Left Main :  The left main   large vessel without disease    Left Anterior Descending : Good caliber vessel gives rise to diagonals and septals without disease    Left Circumflex : Gives rise to large principal marginal continues in the AV groove gives rise to 2 more branches without disease    Right Coronary Artery :  The right coronary artery   Is dominant artery without disease    Dominance:  []  Left  [x]  Right  []  Co-Dominant      Left Ventriculography:    Estimated Ejection Fraction: 15 %  Wall motion abnormalities: LV with severe global left ventricular hypokinesis  Mitral Regurgitation: 2-3+ MR    Impression:    Nonobstructive CAD  LV with severe LV dysfunction  Elevated LVEDP  Mitral regurgitation    Recommendations:    Medical management risk factor modification  Outpatient follow-up  with structural heart to evaluate and treat MR        I sincerely appreciate the opportunity to participate in your patient's care. Please feel free to contact me anytime if I can be of assistance in this or any other way.      Pertinent History  History reviewed. No pertinent past medical history.  History reviewed. No pertinent surgical history.  Prior to Admission medications  Medication Sig Start Date End Date Taking? Authorizing Provider  albuterol sulfate  (90 Base) MCG/ACT inhaler Inhale 1 puff Every 4 (Four) to 6 (Six) Hours As Needed for Wheezing or Shortness of Air.   Yes Izabel Rios MD  dextromethorphan polistirex ER (DELSYM) 30 MG/5ML Suspension Extended Release oral suspension Take 10 mL by mouth 2 (Two) Times a Day As Needed (cough).   Yes Izabel Rios MD  glipizide (GLUCOTROL XL) 5 MG ER tablet Take 1 tablet by mouth Daily.   Yes Izabel Rios MD  metFORMIN (GLUCOPHAGE) 500 MG tablet Take 2 tablets by mouth 2 (Two) Times a Day With Meals.   Yes Izabel Rios MD  oxyCODONE (ROXICODONE) 10 MG tablet Take 0.5-1 tablets by mouth 3 (Three) Times a Day As Needed for Moderate Pain or Severe Pain.   Yes Izabel Rios MD  Testosterone Cypionate 200 MG/ML solution Inject 1.5 mL into the appropriate muscle as directed by prescriber Every 7 (Seven) Days.   Yes Izabel Rios MD      Pre-Procedure Notes  H&P Performed  [x]  Yes []  No       []  N/A    Indications:  []  ACS <= 24 HRS  []  ACS >24 HRS  []  New Onset Angina <= 2 mos  []  Worsening Angina  []  Resuscitated Cardiac Arrest  []  Angina on Exertion:  []  Suspected CAD  []  Valvular Disease  []  Pericardial Disease  []  Cardiac Arrythmia  []  Cardiomyopathy  []  LV Dysfunction  []  Syncope  []  Post Cardiac Transplant  []  Eval. For Exercise Clearance  []  Other  []  Pre-Operative Evaluation  If Pre-Op Eval:  Evaluation for Surgery Type:  []  Cardiac Surgery   []  Non-Cardiac Surgery  Functional  Capacity:  []  <4 METS  []  >=4 METS w/o symptoms  []  >= 4 METS with symptoms  []  Unknown  Surgical Risk:  []  Low  []  Intermediate  []  High Risk: Vascular  []  High Risk Non-Vascular    Risks, Benefits, & Complications Discussed:  [x]  Yes  []  No  []  N/A    Questions Answered:  [x]  Yes  []  No  []  N/A    Consent Obtained:  [x]  Yes  []  No  []  N/A    CHF: [x]  Yes  []  No  If Yes:  Newly Diagnosed?  [x]  Yes  []  No  If Yes:  HF Type:  []  Diastolic  [x]  Systolic  []  Unknown      Davey Oliveira MD  6/4/2025  14:35 EDT  Electronically signed by Davey Oliveira MD, 06/04/25, 2:35 PM EDT.      ---------------------------------------------------------------------------  CXR/Imaging:     Imaging Results (Most Recent)       None                -----------------------------------------------------------------------------  CT:   US Renal Bilateral  Result Date: 6/3/2025  1.No hydronephrosis or renal calculi identified. Electronically Signed: Richy Leung MD  6/3/2025 10:29 AM EDT  Workstation ID: [id number]    CT Angiogram Chest Pulmonary Embolism  Result Date: 6/3/2025  Impression: 1.No evidence of pulmonary embolus. 2.Widespread groundglass attenuation of the lungs without lobar consolidation. Few scattered areas of more dense airspace disease most pronounced in the lingula. Findings are most suggestive of infectious process in particular viral or atypical pathogen.  Noninfectious pulmonary edema is a consideration as well. Correlate with symptoms. 3.Small bilateral pleural effusions. 4.Benign 2.4 cm left adrenal adenoma. Electronically Signed: Kyle Gongora MD  6/3/2025 7:55 AM EDT  Workstation ID: [id number]          --------------------------------------------------------------------------------------------------------------    Laboratory evaluations:    Hospital Outpatient Visit on 06/24/2025   Component Date Value    proBNP 06/24/2025 248.0     Color, UA 06/24/2025 Yellow     Appearance,  UA 06/24/2025 Clear     pH, UA 06/24/2025 5.5     Specific Gravity, UA 06/24/2025 1.030     Glucose, UA 06/24/2025 Negative     Ketones, UA 06/24/2025 15 mg/dL (1+) (A)     Bilirubin, UA 06/24/2025 Negative     Blood, UA 06/24/2025 Negative     Protein, UA 06/24/2025 30 mg/dL (1+) (A)     Leuk Esterase, UA 06/24/2025 Negative     Nitrite, UA 06/24/2025 Negative     Urobilinogen, UA 06/24/2025 1.0 E.U./dL     Glucose 06/24/2025 172 (H)     BUN 06/24/2025 28.1 (H)     Creatinine 06/24/2025 1.61 (H)     Sodium 06/24/2025 136     Potassium 06/24/2025 4.9     Chloride 06/24/2025 96 (L)     CO2 06/24/2025 30.6 (H)     Calcium 06/24/2025 9.6     Albumin 06/24/2025 4.3     Phosphorus 06/24/2025 4.0     Anion Gap 06/24/2025 9.4     BUN/Creatinine Ratio 06/24/2025 17.5     eGFR 06/24/2025 56.5 (L)     RBC, UA 06/24/2025 0-2     WBC, UA 06/24/2025 0-2     Bacteria, UA 06/24/2025 None Seen     Squamous Epithelial Cell* 06/24/2025 0-2     Hyaline Casts, UA 06/24/2025 0-2     Methodology 06/24/2025 Automated Microscopy    Lab on 06/18/2025   Component Date Value    proBNP 06/18/2025 134.0     Glucose 06/18/2025 337 (H)     BUN 06/18/2025 27.7 (H)     Creatinine 06/18/2025 1.61 (H)     Sodium 06/18/2025 134 (L)     Potassium 06/18/2025 5.2     Chloride 06/18/2025 98     CO2 06/18/2025 25.4     Calcium 06/18/2025 9.2     BUN/Creatinine Ratio 06/18/2025 17.2     Anion Gap 06/18/2025 10.6     eGFR 06/18/2025 56.5 (L)    Hospital Outpatient Visit on 06/11/2025   Component Date Value    QT Interval 06/11/2025 354     QTC Interval 06/11/2025 431    Hospital Outpatient Visit on 06/11/2025   Component Date Value    proBNP 06/11/2025 160.0     Glucose 06/11/2025 237 (H)     BUN 06/11/2025 28.5 (H)     Creatinine 06/11/2025 1.59 (H)     Sodium 06/11/2025 131 (L)     Potassium 06/11/2025 5.4 (H)     Chloride 06/11/2025 95 (L)     CO2 06/11/2025 25.7     Calcium 06/11/2025 9.0     BUN/Creatinine Ratio 06/11/2025 17.9     Anion Gap 06/11/2025  10.3     eGFR 06/11/2025 57.3 (L)    No results displayed because visit has over 200 results.          Result Review:  I have personally reviewed the results from the time of this admission to 6/24/2025 17:01 EDT and agree with these findings:  [x]  Laboratory list / accordion  []  Microbiology  []  Radiology  []  EKG/Telemetry   []  Cardiology/Vascular   []  Pathology  [x]  Old records  []  Other:  Most notable findings include: proBNP is 2/2/1949 glucose mildly elevated at 172, EGFR at same as 6 days ago @ 57           Diagnoses and all orders for this visit:    1. Nonischemic dilated cardiomyopathy (Primary)  Overview:  A.  LVIDd 6 cm  B.  Chronic combined systolic and diastolic heart failure (HFrEF)   i (TTE 6/3/25) EF 19% grade 3 diastolic dysfunction  C.  LifeVest    Orders:  -     proBNP; Standing  -     proBNP  -     Adult Transthoracic Echo Complete W/ Cont if Necessary Per Protocol; Future    2. Valvular heart disease  Overview:  A.  Severe mitral regurgitation      3. CKD vs DAYAMI  -     Urinalysis With Culture If Indicated -; Standing  -     Urinalysis With Culture If Indicated - Urine, Random Void  -     Urinalysis, Microscopic Only - Urine, Clean Catch; Standing  -     Urinalysis, Microscopic Only - Urine, Random Void    4. hypotension  -     Urinalysis With Culture If Indicated -; Standing  -     Urinalysis With Culture If Indicated - Urine, Random Void  -     Urinalysis, Microscopic Only - Urine, Clean Catch; Standing  -     Urinalysis, Microscopic Only - Urine, Random Void    5. Propensity for sinus tachycardia    6. RANDHAWA (dyspnea on exertion)  -     proBNP; Standing  -     proBNP  -     Cancel: Basic Metabolic Panel; Standing  -     Cancel: Basic Metabolic Panel    7. Metabolic syndrome X  Overview:  A.  Benign essential hypertension  B.  Elevated BMI  C.  T2DM versus prediabetes on glipizide    Orders:  -     Renal Function Panel; Future  -     Renal Function Panel; Standing  -     Renal Function  Panel    Other orders  -     metoprolol succinate XL (TOPROL-XL) 25 MG 24 hr tablet; Take 1 tablet by mouth Every 12 (Twelve) Hours. Hold for systolic blood pressure <95 and/or a heart < 60  Dispense: 180 tablet; Refill: 2  -     sacubitril-valsartan (ENTRESTO) 24-26 MG tablet; Take 0.5 tablets by mouth Every 12 (Twelve) Hours.  Dispense: 60 tablet; Refill: 1        NYHA STAGE B; FC-II.  Clinical status was assessed and has slightly improved.       Today, Patient appears euvolemic.  The patient's hemodynamics are currently unacceptable. HR is: tachycardic and  BP/MAP was reviewed and there is room for medication up-titration.    The patient's clinical course has been impacted by Other factors which would include the hypotension as well as his CKD versus DAYAMI.    GDMT Assessment: The patient is currently on as good a goal-directed medical therapy.  As his renal function and hypotension would allow  We do have room to optimize their medications based on HD assessment today, GFR, and electrolytes.     GDMT changes recommended today: Increase beta-blocker.      BB:   increase Metoprolol Succinate 25 mg bid  Monitor heart rate.  Please call the heart failure clinic if you did not taken for 3 doses in a row (hold for systolic blood pressure < 100 mmHg or for HR<60, and/or if you are having any dizziness, lightheadedness, syncope    A/A/A:     decrease Entresto to half of a 24/26mg BID  Occasional monitoring of Chem-7 is recommended; call for the development of a new cough or S/Sx angioedema    [id number]:  Holding off for now until his renal status stabilizes and will get the okay from nephrology    MRA:  Spironolactone remains on hold until repeat labs are done  Initiation instructions: Obtain a CHEM7 after 4-5 doses.  We will call with the results. RTC 2 weeks for hemodynamic assessment and repeat laboratory monitoring.  Recommended at least quarterly assessment of GFR and electrolytes for the first 12 months  After the  patient has been on MRA therapy for 12 months without high-risk features, recommended q 6 monthly BMPs and assessment, per guidelines.   Avoid potassium supplementation particularly since he has been somewhat hyperkalemic  The patient has not required potassium supplementation to maintain adequate serum levels.    Avoid salt substitutes containing potassium i.e. . Chuckie etc.  Please hold this medication due to potential side effects if diarrhea, vomiting, or infection with fever and sweating occurs      Diuretic Regimen:    Off diuretics due to: Renal function and not getting volume overloaded     Labs/Diagnostics/Referrals:    Labs proBNP and BMP done today in the clinic   Imaging -2D TTE,  for 90 LV EF eval was ordered for September   Referrals No referrals placed today   Currently Pending: none       HF-Specific Device Therapy/AHF    ICD/CRT-D Qualification      [] GDMT X 3 months  [x] EF < 35%  [] QRS Duration @ 105  [] > 150  [] LBBB  [] Atrial Fibrillation  [x] NYHA Class IIc  [x] Life expectancy > 1 year  -Indicated.  But awaiting 90 days  -Indications: Secondary Prevention of Sudden Cardiac Death  -Deferred due to: ,<90-day window       He is awaiting his repeat echo in September  Will wear LifeVest until at point   TMVR Seemingly is-Indicated.  Given his severe MR     [facility]     Discussion     On initial visit to the heart failure clinic he had already been started on several failure core measures including beta-blocker, ARNI,  and MRA inhibitor  Initial visit knowledge overloaded was accompanied by his wife  Seems to be sticking to majority of parameters 6/24/2025  I am going ahead and decreasing his Entresto to one half the lowest dose for the next 2 weeks  We will recheck labs  Actually cut his spironolactone due to hyperkalemia down to 12.5 daily  Increase Toprol due to tachycardia discussed food and fluid restrictions  Plan on seeing again in 1 month  however will get labs at 2-week point   Will obviously need follow-up echocardiogram (ordered 3 Sept 25) at 90 days to remove his LifeVest if his EF is > 35 if not then will need to proceed with ICD consideration    I did the following activities preparing for the visit with Twan whitley: reviewing tests, once pt arrived in clinic I also performed a medically appropriate examination and/or evaluation, I personally spent considerable time counseling and educating the patient/family/caregiver, ordering medications, tests, or procedures, referring and communicating with other health care professionals, and documenting information in the medical record. I estimate including preparation 30 minutes          Lifestyle Advice: A hand-out has on this our previous visit been provided to the patient.   - call office if I gain more than 3 pounds in one day or 5 pounds in one week   - keep legs up while sitting   - use salt in moderation recommended 2 gms, or 2000 mg daily max   - watch for swelling in feet, ankles and legs every day   - weigh daily   -call for concerning S/Sx   -- activity or exercise based on tolerance encouraged     CODE STATUS: FULL

## 2025-06-24 ENCOUNTER — DISEASE STATE MANAGEMENT VISIT (OUTPATIENT)
Facility: HOSPITAL | Age: 35
End: 2025-06-24
Payer: MEDICAID

## 2025-06-24 ENCOUNTER — READMISSION MANAGEMENT (OUTPATIENT)
Dept: CALL CENTER | Facility: HOSPITAL | Age: 35
End: 2025-06-24
Payer: MEDICAID

## 2025-06-24 ENCOUNTER — HOSPITAL ENCOUNTER (OUTPATIENT)
Facility: HOSPITAL | Age: 35
Discharge: HOME | End: 2025-06-24
Admitting: NURSE PRACTITIONER

## 2025-06-24 ENCOUNTER — TRANSCRIBE ORDERS (OUTPATIENT)
Dept: ADMINISTRATIVE | Facility: HOSPITAL | Age: 35
End: 2025-06-24
Payer: MEDICAID

## 2025-06-24 VITALS
RESPIRATION RATE: 16 BRPM | SYSTOLIC BLOOD PRESSURE: 107 MMHG | HEART RATE: 104 BPM | OXYGEN SATURATION: 98 % | DIASTOLIC BLOOD PRESSURE: 78 MMHG | WEIGHT: 222.4 LBS | BODY MASS INDEX: 31.02 KG/M2

## 2025-06-24 DIAGNOSIS — R06.09 DOE (DYSPNEA ON EXERTION): ICD-10-CM

## 2025-06-24 DIAGNOSIS — I95.9 SEVERE HYPOTENSION: ICD-10-CM

## 2025-06-24 DIAGNOSIS — I38 VALVULAR HEART DISEASE: ICD-10-CM

## 2025-06-24 DIAGNOSIS — E88.810 METABOLIC SYNDROME X: ICD-10-CM

## 2025-06-24 DIAGNOSIS — I50.21 HEART FAILURE, SYSTOLIC, ACUTE: ICD-10-CM

## 2025-06-24 DIAGNOSIS — R00.0 SINUS TACHYCARDIA: ICD-10-CM

## 2025-06-24 DIAGNOSIS — N17.9 ACUTE RENAL FAILURE, UNSPECIFIED ACUTE RENAL FAILURE TYPE: Chronic | ICD-10-CM

## 2025-06-24 DIAGNOSIS — I42.0 NONISCHEMIC DILATED CARDIOMYOPATHY: Primary | ICD-10-CM

## 2025-06-24 DIAGNOSIS — I95.9 SEVERE HYPOTENSION: Chronic | ICD-10-CM

## 2025-06-24 DIAGNOSIS — N17.9 ACUTE RENAL FAILURE, UNSPECIFIED ACUTE RENAL FAILURE TYPE: Primary | ICD-10-CM

## 2025-06-24 LAB
ALBUMIN SERPL-MCNC: 4.3 G/DL (ref 3.5–5.2)
ANION GAP SERPL CALCULATED.3IONS-SCNC: 9.4 MMOL/L (ref 5–15)
BACTERIA UR QL AUTO: NORMAL /HPF
BILIRUB UR QL STRIP: NEGATIVE
BUN SERPL-MCNC: 28.1 MG/DL (ref 6–20)
BUN/CREAT SERPL: 17.5 (ref 7–25)
CALCIUM SPEC-SCNC: 9.6 MG/DL (ref 8.6–10.5)
CHLORIDE SERPL-SCNC: 96 MMOL/L (ref 98–107)
CLARITY UR: CLEAR
CO2 SERPL-SCNC: 30.6 MMOL/L (ref 22–29)
COLOR UR: YELLOW
CREAT SERPL-MCNC: 1.61 MG/DL (ref 0.76–1.27)
EGFRCR SERPLBLD CKD-EPI 2021: 56.5 ML/MIN/1.73
GLUCOSE SERPL-MCNC: 172 MG/DL (ref 65–99)
GLUCOSE UR STRIP-MCNC: NEGATIVE MG/DL
HGB UR QL STRIP.AUTO: NEGATIVE
HYALINE CASTS UR QL AUTO: NORMAL /LPF
KETONES UR QL STRIP: ABNORMAL
LEUKOCYTE ESTERASE UR QL STRIP.AUTO: NEGATIVE
NITRITE UR QL STRIP: NEGATIVE
NT-PROBNP SERPL-MCNC: 248 PG/ML (ref 0–450)
PH UR STRIP.AUTO: 5.5 [PH] (ref 5–8)
PHOSPHATE SERPL-MCNC: 4 MG/DL (ref 2.5–4.5)
POTASSIUM SERPL-SCNC: 4.9 MMOL/L (ref 3.5–5.2)
PROT UR QL STRIP: ABNORMAL
RBC # UR STRIP: NORMAL /HPF
REF LAB TEST METHOD: NORMAL
SODIUM SERPL-SCNC: 136 MMOL/L (ref 136–145)
SP GR UR STRIP: 1.03 (ref 1–1.03)
SQUAMOUS #/AREA URNS HPF: NORMAL /HPF
UROBILINOGEN UR QL STRIP: ABNORMAL
WBC # UR STRIP: NORMAL /HPF

## 2025-06-24 PROCEDURE — G0463 HOSPITAL OUTPT CLINIC VISIT: HCPCS

## 2025-06-24 PROCEDURE — 83880 ASSAY OF NATRIURETIC PEPTIDE: CPT | Performed by: NURSE PRACTITIONER

## 2025-06-24 PROCEDURE — 99214 OFFICE O/P EST MOD 30 MIN: CPT | Performed by: NURSE PRACTITIONER

## 2025-06-24 PROCEDURE — 81001 URINALYSIS AUTO W/SCOPE: CPT | Performed by: INTERNAL MEDICINE

## 2025-06-24 PROCEDURE — 80069 RENAL FUNCTION PANEL: CPT | Performed by: INTERNAL MEDICINE

## 2025-06-24 RX ORDER — METOPROLOL SUCCINATE 25 MG/1
25 TABLET, EXTENDED RELEASE ORAL EVERY 12 HOURS SCHEDULED
Qty: 180 TABLET | Refills: 2 | Status: SHIPPED | OUTPATIENT
Start: 2025-06-24

## 2025-06-24 NOTE — OUTREACH NOTE
CHF Week 2 Survey      Flowsheet Row Responses   [facility] facility patient discharged from? [facility]   Does the patient have one of the following disease processes/diagnoses(primary or secondary)? CHF   Week 2 attempt successful? No   Unsuccessful attempts Attempt 2            KIMBERLEY DICKSON - Registered Nurse

## 2025-06-24 NOTE — PROGRESS NOTES
[facility] HEART FAILURE CLINIC - PHARMACY SERVICE    Patient Name: Twan Santos  :1990  Age: 35 y.o.  Sex: male  Primary Cardiologist: OWEN Cantu (25)  Nephrology: Roldan (25)  PCP: Grief     HFrEF with EF 19% (Last Echo: 6/3/25).    NYHA Class II C     ECHO:    Results for orders placed during the hospital encounter of 25    Adult Transthoracic Echo Complete W/ Cont if Necessary Per Protocol    Interpretation Summary    Left ventricular systolic function is severely decreased. Calculated left ventricular EF = 19.1%    The left ventricular cavity is moderate to severely dilated.    Left ventricular diastolic function is consistent with (grade III w/high LAP) fixed restrictive pattern.  Average GLS -2.8%.    Moderately reduced right ventricular systolic function noted.    The right ventricular cavity is dilated.    Left atrial volume is moderately increased.    Abnormal mitral valve structure consistent with dilated annulus.    Severe mitral valve regurgitation is present.    Estimated right ventricular systolic pressure from tricuspid regurgitation is mildly elevated (35-45 mmHg).      The patient's last EKG was reviewed from 25 and shows a QTcB of 434 ms.     ICD: no- wearing LifeVest    CKD: N/A    BMP          2025    13:46 2025    06:42 2025    15:14   BMP   BUN 28.5  27.7  28.1    Creatinine 1.59  1.61  1.61    Sodium 131  134  136    Potassium 5.4  5.2  4.9    Chloride 95  98  96    CO2 25.7  25.4  30.6    Calcium 9.0  9.2  9.6        Lab Results   Component Value Date    EGFR 56.5 (L) 2025    EGFR 56.5 (L) 2025    EGFR 57.3 (L) 2025       Lab Results   Component Value Date    PROBNP 248.0 2025    PROBNP 134.0 2025    PROBNP 160.0 2025       Recent Vitals         2025       BP: 109/75 95/66 107/78     Pulse: 106 100 104     Temp: 97.5 °F (36.4 °C) -- --     Weight: -- 106 kg (233 lb 9.6 oz) 101 kg  (222 lb 6.4 oz)     BMI (Calculated): -- 32.6 31              -CHF-specific BB:      Pre Visit Dose: Metoprolol succinate XL 12.5 mg PO BID    Post Visit Dose: Metoprolol succinate XL 25 mg PO BID     - Target Dose: Metoprolol succinate  mg PO daily.     - Goal HR of 50s to 60s.     - Patient should be seen every 10 to 14 days for a pulse check with plans for up-titration until target heart rate is achieved.        -ACE/ARB/ARNI:     Pre Visit Dose: Sacubitril/valsartan 24/26 mg BID    Post Visit Dose: Sacubitril/valsartan 12/23 mg (one-half tablet of 24/26 mg) BID    - Target Dose: Sacubitril/valsartan 97/103 mg PO BID    - Patient should have a follow-up appointment every 2 to 4 weeks for a hemodynamic check with possible up-titration to target dose.         -SGLT2 inhibitor therapy:    Pre Visit Dose: None    Post Visit Dose: None- Approved for Farxiga PAP via [place] (patient recieved-plan to start once renal function stabilizes)    - Target Dose: N/A : CrCl > 20 mL/min which has shown benefit in patients with HF    -DIURETIC:     Pre Visit Dose: None    Post Visit Dose: None     -MRA:     Pre Visit Dose: None- stoppe 6/19 due to hyperkalemia    Post Visit Dose: None    - Target Dose: N/A    - K is < 5 mEq/L and SCr is </= 2.5 mg/dL in male and eGFR > 30 mL/min/1.73m3    Lab Results   Component Value Date    K 4.9 06/24/2025       Lab Results   Component Value Date    CREATININE 1.61 (H) 06/24/2025         -MAGNESIUM:     Mag not assessed today    Lab Results   Component Value Date    MG 2.0 06/03/2025       Pre Visit Dose: None    Post Visit Dose: None      -ANTICOAGULATION:     None    -OTHER CV MEDS:     Pre Visit Dose: None    Post Visit Dose: None    -Clinic Administered Medications:     None         Patient Assistance:      Approved for Farxiga patient assistance through 12/31/25           PLAN:  Initiation/Discontinuation/Dose Adjustment: Reduce sacubitril/valsartan to 12/23 mg twice daily due to  borderline hypotension and continued elevated serum creatine. Increase metoprolol to 25 mg twice daily for tachycardia.   Education provided: Discussed medication changes. Patient reported that his diabetes medications (metformin and glipizide were discontinued while hospitalized and has not been resumed since 6/6). Patient urged to contact PCP and ask for earliest appointment/instructions for resuming medications as he says that his blood sugars have been running in the 300s. Offered endocrinology referral to patient which he declines at this time.   Coordination of Care: None  Refills: Sacubitril/valsartan and metoprolol  Follow-up: Labs in 2 weeks to monitor renal function. Plan to initiate Farxiga once renal function stabilized. Return to clinic in one month.       Thank you for allowing me to participate in the care of your patient.    Shamika Nichole, PharmD  [facility] Heart Failure Clinic  06/24/25  16:46 EDT

## 2025-07-03 ENCOUNTER — READMISSION MANAGEMENT (OUTPATIENT)
Dept: CALL CENTER | Facility: HOSPITAL | Age: 35
End: 2025-07-03
Payer: MEDICAID

## 2025-07-03 NOTE — OUTREACH NOTE
CHF Week 2 Survey      Flowsheet Row Responses   [facility] patient discharged from? [facility]   Does the patient have one of the following disease processes/diagnoses(primary or secondary)? CHF   Call start time 0950   Call end time 0952   Discharge diagnosis Acute HFrEF due to systolic dysfunction of unclear etiology   Meds reviewed with patient/caregiver? Yes   Is the patient having any side effects they believe may be caused by any medication additions or changes? No   Does the patient have all medications ordered at discharge? Yes   Is the patient taking all medications as directed (includes completed medication regime)? Yes   Has the patient kept scheduled appointments due by today? N/A   Has home health visited the patient within 72 hours of discharge? N/A   Pulse Ox monitoring None   Psychosocial issues? No   Did the patient receive a copy of their discharge instructions? Yes   Nursing interventions Reviewed instructions with patient   What is the patient's perception of their health status since discharge? Improving   Nursing interventions Nurse provided patient education   Is the patient able to teach back signs and symptoms of worsening condition? (i.e. weight gain, shortness of air, etc.) Yes   If the patient is a current smoker, are they able to teach back resources for cessation? Not a smoker   Is the patient able to teach back Heart Failure Zones? Yes   CHF Nursing Interventions Education provided on various zones   CHF Zone this Call Green Zone   Green Zone No chest pain, Weight check stable, No new swelling -  feet, ankles and legs look normal for you, No new or worsening shortness of breath, Patient reports doing well   Green Zone Interventions Daily weight check, Low sodium diet, Meds as directed, Follow up visits planned   Is the patient interested in additional calls from an ambulatory ? No   Would this patient benefit from a Referral to [facility] Social Work? No   Call end time 0952             Vesna T - Registered Nurse

## 2025-07-06 NOTE — PROGRESS NOTES
"    Subjective:     Encounter Date:07/07/2025      Patient ID: Twan Santos is a 35 y.o. male.    Chief Complaint: Hospital follow up non ischemic cardiomyopathy/HFrEF   moderate MR     History of Present Illness    Twan Santos  \"Donovan\" is a 35 year old male with a past history of:     - non ischemic cardiomyopathy with severe LV dysfunction and grade III diastolic dysfunction   - valvular disease with moderate to severe MR   -prediabetes on metformin  -nonsmoker  -no prior cardiac history     Who presented to the ED with a 1 month history of shortness of breath. Patient reports that initially he started with some dyspnea back in March 2025. He thought it was allergy related. He went to urgent care and was placed on a Z-chris and albuterol inhaler with some relief of symptoms. He still felt poorly and went to his PCP. Now patient reports he has had more dyspnea particularly with exertion. He feels his right side is worse than his left.      Workup in the ED shows serial troponin of 23 and 32, , Cr 1.28, TSH normal.  CT chest shows no PE, groundglass appearance of lungs without lobular consolidation, suggestive of infectious process viral or atypical pathogen. Small bilateral pleural effusions.  EKG sinus tachycardia.  Echocardiogram EF severely reduced 19%, LV severely dilated, LVDF c/w grade III fixed restrictive pattern, severe MR   Patient underwent cardiac cath on 6/4/2025 that showed nonobstructive disease with severe LV dysfunction with a EF less than 20 and elevated LVEDP with 2-3+ MR       Patient is here for hospital follow up.  He has been seen twice by the Heart failure clinic and medications have been adjusted as patient has been having hypotension.      Today, patient denies any chest pain, shortness of breath or lower extremity edema.  He does report feeling lightheaded.  His blood pressure has been low and he was told to hold his metoprolol but to take his 1/2 entresto.     Blood pressure in office " "today was 124/77 HR 85 oxygen 98% on room air weight 220 lbs   repeat blood pressure with manual cuff 90/60    Lab Review:   HS troponin 23-->32  6/4/2025: BUN/creatinine 11.51.26, glucose 165, normal CBC  6/5/2025: potassium 3.6 glucose 169     6/11/2025: bnp 160 sodium 131 potassium 5.4 BUN 28 creatinine 1.59 glucose 237     6/18/2025: bnp 134 sodium 134 potassium 5.2 bun 27 creatinine 1.61 glucose 337    6/24/2025: bnp 268 k 4.9 bun 28 creatinine 1.61 glucose 1.72    The following portions of the patient's history were reviewed and updated as appropriate: allergies, current medications, past family history, past medical history, past social history, past surgical history, and problem list.    Review of Systems   Constitutional: Negative for malaise/fatigue.   Cardiovascular:  Negative for chest pain, dyspnea on exertion, leg swelling and palpitations.   Respiratory:  Negative for cough and shortness of breath.    Gastrointestinal:  Negative for abdominal pain, nausea and vomiting.   Neurological:  Positive for light-headedness. Negative for dizziness, headaches, numbness and weakness.   All other systems reviewed and are negative.    History reviewed. No pertinent past medical history.  Past Surgical History:   Procedure Laterality Date    CARDIAC CATHETERIZATION N/A 6/4/2025    Procedure: Left Heart Cath;  Surgeon: Davey Oliveira MD;  Location: [facility] CATH INVASIVE LOCATION;  Service: Cardiovascular;  Laterality: N/A;     /77 (BP Location: Left arm, Patient Position: Sitting, Cuff Size: Adult)   Pulse 85   Ht 180.3 cm (71\")   Wt 99.8 kg (220 lb)   SpO2 98%   BMI 30.68 kg/m²   Family History   Problem Relation Age of Onset    Anemia Mother        Current Outpatient Medications:     metoprolol succinate XL (TOPROL-XL) 25 MG 24 hr tablet, Take 0.5 tablets by mouth Every 12 (Twelve) Hours. Hold for systolic blood pressure <95 and/or a heart < 60, Disp: , Rfl:     sacubitril-valsartan (ENTRESTO) " 24-26 MG tablet, Take 0.5 tablets by mouth Every 12 (Twelve) Hours. HOLD FOR BLOOD PRESSURE LESS THAN 90, Disp: , Rfl:     Testosterone Cypionate 200 MG/ML solution, Inject 1.5 mL into the appropriate muscle as directed by prescriber Every 7 (Seven) Days., Disp: , Rfl:   No Known Allergies  Social History     Socioeconomic History    Marital status:    Tobacco Use    Smoking status: Never     Passive exposure: Past    Smokeless tobacco: Never   Vaping Use    Vaping status: Never Used   Substance and Sexual Activity    Alcohol use: Not Currently    Drug use: Never    Sexual activity: Never                Objective:     Vitals reviewed.   Constitutional:       Appearance: Healthy appearance. Not in distress.   Neck:      Vascular: No JVR. JVD normal.   Pulmonary:      Effort: Pulmonary effort is normal.      Breath sounds: Normal breath sounds. No wheezing. No rhonchi. No rales.   Chest:      Chest wall: Not tender to palpatation.   Cardiovascular:      PMI at left midclavicular line. Normal rate. Regular rhythm. Normal S1. Normal S2.       Murmurs: There is a grade 2/6 systolic murmur.      No gallop.  No click. No rub.   Pulses:     Intact distal pulses.   Edema:     Peripheral edema absent.   Abdominal:      General: Bowel sounds are normal.      Palpations: Abdomen is soft.      Tenderness: There is no abdominal tenderness.   Musculoskeletal: Normal range of motion.         General: No tenderness. Skin:     General: Skin is warm and dry.   Neurological:      General: No focal deficit present.      Mental Status: Alert and oriented to person, place and time.       Procedures                  Assessment:     MDM       Diagnosis Plan   1. Nonischemic dilated cardiomyopathy        2. Moderate mitral regurgitation        3. CKD (chronic kidney disease) stage 2, GFR 60-89 ml/min        4. Obesity (BMI 30-39.9)                       Plan:   Chart reviewed   Labs reviewed   Echocardiogram reviewed   Reviewed  medications and changed blood pressure parameters   Advised patient to take metoprolol but to hold entresto if blood pressure is low.      He to control heart rate with the moderate-severe mitral regurgitation   Patient has life vest in place.  Continue until repeat echocardiogram which is scheduled for 9/13/2025      - Daily weight:  same time every day, same clothing   - Low Salt (sodium) diet   - Watch fluid intake                 This document is intended for medical expert use only.  Reading of this document by patients and/or patient's family without participating medical staff guidance may result in misinterpretation and unintended morbidity. Any interpretation of such data is the responsibility of the patient and/or family member responsible for the patient in concert with their primary or specialist providers, not to be left for sources of online search as such as IMNEXT, VUELOGIC or similar queries.  Relying on these approaches to knowledge may result in misinterpretation, misguided goals of care and even death should patient or family members try recommendations outside of the realm of professional medical care in a supervised inpatient environment.

## 2025-07-07 ENCOUNTER — OFFICE VISIT (OUTPATIENT)
Dept: CARDIOLOGY | Facility: CLINIC | Age: 35
End: 2025-07-07

## 2025-07-07 VITALS
HEIGHT: 71 IN | HEART RATE: 85 BPM | BODY MASS INDEX: 30.8 KG/M2 | SYSTOLIC BLOOD PRESSURE: 124 MMHG | WEIGHT: 220 LBS | DIASTOLIC BLOOD PRESSURE: 77 MMHG | OXYGEN SATURATION: 98 %

## 2025-07-07 DIAGNOSIS — E66.9 OBESITY (BMI 30-39.9): Chronic | ICD-10-CM

## 2025-07-07 DIAGNOSIS — I34.0 MODERATE MITRAL REGURGITATION: ICD-10-CM

## 2025-07-07 DIAGNOSIS — N18.2 CKD (CHRONIC KIDNEY DISEASE) STAGE 2, GFR 60-89 ML/MIN: Chronic | ICD-10-CM

## 2025-07-07 DIAGNOSIS — I42.0 NONISCHEMIC DILATED CARDIOMYOPATHY: Primary | ICD-10-CM

## 2025-07-07 PROCEDURE — 99214 OFFICE O/P EST MOD 30 MIN: CPT | Performed by: NURSE PRACTITIONER

## 2025-07-07 RX ORDER — METOPROLOL SUCCINATE 25 MG/1
12.5 TABLET, EXTENDED RELEASE ORAL EVERY 12 HOURS SCHEDULED
Start: 2025-07-07

## 2025-07-08 ENCOUNTER — LAB (OUTPATIENT)
Dept: LAB | Facility: HOSPITAL | Age: 35
End: 2025-07-08
Payer: MEDICAID

## 2025-07-08 DIAGNOSIS — R06.09 DOE (DYSPNEA ON EXERTION): ICD-10-CM

## 2025-07-08 DIAGNOSIS — I42.0 NONISCHEMIC DILATED CARDIOMYOPATHY: ICD-10-CM

## 2025-07-08 LAB
ANION GAP SERPL CALCULATED.3IONS-SCNC: 11.5 MMOL/L (ref 5–15)
BUN SERPL-MCNC: 24.4 MG/DL (ref 6–20)
BUN/CREAT SERPL: 15.9 (ref 7–25)
CALCIUM SPEC-SCNC: 9.4 MG/DL (ref 8.6–10.5)
CHLORIDE SERPL-SCNC: 99 MMOL/L (ref 98–107)
CO2 SERPL-SCNC: 26.5 MMOL/L (ref 22–29)
CREAT SERPL-MCNC: 1.53 MG/DL (ref 0.76–1.27)
EGFRCR SERPLBLD CKD-EPI 2021: 60.1 ML/MIN/1.73
GLUCOSE SERPL-MCNC: 154 MG/DL (ref 65–99)
NT-PROBNP SERPL-MCNC: 163 PG/ML (ref 0–450)
POTASSIUM SERPL-SCNC: 4.8 MMOL/L (ref 3.5–5.2)
SODIUM SERPL-SCNC: 137 MMOL/L (ref 136–145)

## 2025-07-08 PROCEDURE — 80048 BASIC METABOLIC PNL TOTAL CA: CPT

## 2025-07-08 PROCEDURE — 83880 ASSAY OF NATRIURETIC PEPTIDE: CPT

## 2025-07-14 DIAGNOSIS — I50.22 CHRONIC HFREF (HEART FAILURE WITH REDUCED EJECTION FRACTION): Primary | ICD-10-CM

## 2025-07-21 ENCOUNTER — TELEPHONE (OUTPATIENT)
Dept: CARDIAC REHAB | Facility: HOSPITAL | Age: 35
End: 2025-07-21
Payer: MEDICAID

## 2025-08-16 ENCOUNTER — LAB (OUTPATIENT)
Dept: LAB | Facility: HOSPITAL | Age: 35
End: 2025-08-16
Payer: COMMERCIAL

## 2025-08-16 DIAGNOSIS — R06.00 DYSPNEA, UNSPECIFIED TYPE: ICD-10-CM

## 2025-08-16 DIAGNOSIS — E88.810 METABOLIC SYNDROME X: ICD-10-CM

## 2025-08-16 LAB
ANION GAP SERPL CALCULATED.3IONS-SCNC: 12.6 MMOL/L (ref 5–15)
BUN SERPL-MCNC: 14.3 MG/DL (ref 6–20)
BUN/CREAT SERPL: 13.6 (ref 7–25)
CALCIUM SPEC-SCNC: 9.2 MG/DL (ref 8.6–10.5)
CHLORIDE SERPL-SCNC: 101 MMOL/L (ref 98–107)
CO2 SERPL-SCNC: 25.4 MMOL/L (ref 22–29)
CREAT SERPL-MCNC: 1.05 MG/DL (ref 0.76–1.27)
EGFRCR SERPLBLD CKD-EPI 2021: 94.3 ML/MIN/1.73
GLUCOSE SERPL-MCNC: 167 MG/DL (ref 65–99)
NT-PROBNP SERPL-MCNC: 117 PG/ML (ref 0–450)
POTASSIUM SERPL-SCNC: 4.3 MMOL/L (ref 3.5–5.2)
SODIUM SERPL-SCNC: 139 MMOL/L (ref 136–145)

## 2025-08-16 PROCEDURE — 83880 ASSAY OF NATRIURETIC PEPTIDE: CPT

## 2025-08-16 PROCEDURE — 36415 COLL VENOUS BLD VENIPUNCTURE: CPT

## 2025-08-16 PROCEDURE — 80048 BASIC METABOLIC PNL TOTAL CA: CPT

## 2025-08-18 ENCOUNTER — HOSPITAL ENCOUNTER (OUTPATIENT)
Facility: HOSPITAL | Age: 35
Discharge: HOME | End: 2025-08-18
Payer: COMMERCIAL

## 2025-08-18 ENCOUNTER — DISEASE STATE MANAGEMENT VISIT (OUTPATIENT)
Facility: HOSPITAL | Age: 35
End: 2025-08-18
Payer: COMMERCIAL

## 2025-08-18 VITALS
OXYGEN SATURATION: 99 % | DIASTOLIC BLOOD PRESSURE: 89 MMHG | HEART RATE: 90 BPM | WEIGHT: 233.6 LBS | BODY MASS INDEX: 32.58 KG/M2 | SYSTOLIC BLOOD PRESSURE: 123 MMHG | RESPIRATION RATE: 16 BRPM

## 2025-08-18 DIAGNOSIS — E88.810 METABOLIC SYNDROME X: ICD-10-CM

## 2025-08-18 DIAGNOSIS — R06.00 DYSPNEA, UNSPECIFIED TYPE: Primary | ICD-10-CM

## 2025-08-18 PROCEDURE — 99214 OFFICE O/P EST MOD 30 MIN: CPT

## 2025-08-18 PROCEDURE — G0463 HOSPITAL OUTPT CLINIC VISIT: HCPCS

## 2025-08-18 RX ORDER — GLIPIZIDE 5 MG/1
5 TABLET, FILM COATED, EXTENDED RELEASE ORAL DAILY
COMMUNITY

## 2025-08-18 RX ORDER — DAPAGLIFLOZIN 10 MG/1
10 TABLET, FILM COATED ORAL DAILY
Start: 2025-08-18

## (undated) DEVICE — CATH DIAG IMPULSE FR4 6F 100CM

## (undated) DEVICE — MYNXGRIP 6F/7F: Brand: MYNXGRIP

## (undated) DEVICE — CATH DIAG IMPULSE FL4 6F 100CM

## (undated) DEVICE — PINNACLE INTRODUCER SHEATH: Brand: PINNACLE

## (undated) DEVICE — CATH DIAG IMPULSE PIG .056 6F 110CM

## (undated) DEVICE — SHT AIR TRANSFR COMFRT GLIDE LAT 40X80IN

## (undated) DEVICE — ELECTRD DEFIB M/FUNC PROPADZ RADIOL 2PK

## (undated) DEVICE — PK TRY HEART CATH 50

## (undated) DEVICE — DGW .035 MC J3MM 150CM T H AMP: Brand: EMERALD

## (undated) DEVICE — NDL PERC 1PRT THNWALL W/BASEPLT 18G 7CM